# Patient Record
Sex: FEMALE | Race: WHITE | NOT HISPANIC OR LATINO
[De-identification: names, ages, dates, MRNs, and addresses within clinical notes are randomized per-mention and may not be internally consistent; named-entity substitution may affect disease eponyms.]

---
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
## 2023-01-04 ENCOUNTER — APPOINTMENT (OUTPATIENT)
Dept: BARIATRICS | Facility: CLINIC | Age: 18
End: 2023-01-04

## 2023-01-05 ENCOUNTER — APPOINTMENT (OUTPATIENT)
Dept: BARIATRICS | Facility: CLINIC | Age: 18
End: 2023-01-05
Payer: MEDICAID

## 2023-01-05 VITALS
HEART RATE: 101 BPM | TEMPERATURE: 96.7 F | DIASTOLIC BLOOD PRESSURE: 74 MMHG | OXYGEN SATURATION: 99 % | SYSTOLIC BLOOD PRESSURE: 114 MMHG | WEIGHT: 286.82 LBS | BODY MASS INDEX: 43.47 KG/M2 | HEIGHT: 68 IN

## 2023-01-05 PROBLEM — E66.01 MORBID (SEVERE) OBESITY DUE TO EXCESS CALORIES: Chronic | Status: ACTIVE | Noted: 2022-12-13

## 2023-01-05 PROBLEM — G47.33 OBSTRUCTIVE SLEEP APNEA (ADULT) (PEDIATRIC): Chronic | Status: ACTIVE | Noted: 2022-12-13

## 2023-01-05 PROCEDURE — 99024 POSTOP FOLLOW-UP VISIT: CPT

## 2023-01-05 RX ORDER — ERGOCALCIFEROL 1.25 MG/1
1.25 MG CAPSULE, LIQUID FILLED ORAL
Qty: 8 | Refills: 0 | Status: DISCONTINUED | COMMUNITY
Start: 2022-12-07 | End: 2023-01-05

## 2023-01-05 RX ORDER — HYOSCYAMINE SULFATE 0.12 MG/1
0.12 TABLET, ORALLY DISINTEGRATING ORAL 4 TIMES DAILY
Qty: 60 | Refills: 1 | Status: DISCONTINUED | COMMUNITY
Start: 2022-12-21 | End: 2023-01-05

## 2023-01-05 RX ORDER — OXYCODONE 5 MG/1
5 TABLET ORAL
Qty: 6 | Refills: 0 | Status: DISCONTINUED | COMMUNITY
Start: 2022-12-11 | End: 2023-01-05

## 2023-01-05 RX ORDER — ONDANSETRON 4 MG/1
4 TABLET, ORALLY DISINTEGRATING ORAL EVERY 8 HOURS
Qty: 15 | Refills: 0 | Status: DISCONTINUED | COMMUNITY
Start: 2022-12-11 | End: 2023-01-05

## 2023-01-05 NOTE — PHYSICAL EXAM
[Normal] : affect appropriate [de-identified] : soft, NT, ND, no diastasis appreciated\par steri strips removed, incisions clean/dry, healing well with wound edges well approximated, no drainage or bleeding

## 2023-01-05 NOTE — ASSESSMENT
[FreeTextEntry1] : 17 year old F is 1 week s/p Laparoscopic Sleeve Gastrectomy and recovering well, feels good. Pt recently started to consume protein powder as she is having nausea from protein shake and anything sweet. Consuming approximately 2 scoops of the protein powder per day.\par Doing well overall.\par \par Nutrition and exercise guidelines reviewed\par Protein focused diet with 3 meals/day\par Discussed with pt additional option for protein source - protein water\par Otherwise continue Isopure if tolerating well\par Increase daily zero calorie liquid intake 64 oz/day\par Constipation remedies list given to pt\par \par Start bariatric vitamins today\par Advance diet to soft foods next week\par Increase activities and track steps - goal 8-10k steps/day\par Lifting restrictions for 5 more weeks - nothing greater than 5 lbs\par \par Return to office in next week\par Followup with Nutritionist and nutrition classes\par All questions answered \par \par Pt seen with Dr. Celis

## 2023-01-05 NOTE — HISTORY OF PRESENT ILLNESS
[Procedure: ___] : Procedure performed: [unfilled]  [Date of Surgery: ___] : Date of Surgery:   [unfilled] [Surgeon Name:   ___] : Surgeon Name: Dr. ARSHAD [Pre-Op Weight ___] : Pre-op weight was [unfilled] lbs [de-identified] : 17 year old F is 1 week s/p Laparoscopic Sleeve Gastrectomy and recovering well, feels good. Pt recently started to consume protein powder as she is having nausea from protein shake and anything sweet. Consuming approximately 2 scoops of the Isopure protein powder/day, ~25g/scoop. Tolerating zero calorie liquid well. Urine color transparent yellow. Reports no BM since surgery but reports no abdominal discomfort, taking Miralax. Taking omeprazole daily. Tolerating walking and wearing binder as needed. No abdominal pain, reflux, nausea, vomiting, constipation or diarrhea, fever, chills or sweats. No calf tenderness, chest pain, palpitations, or shortness of breath.\par

## 2023-01-12 ENCOUNTER — APPOINTMENT (OUTPATIENT)
Dept: BARIATRICS | Facility: CLINIC | Age: 18
End: 2023-01-12
Payer: MEDICAID

## 2023-01-12 VITALS — HEIGHT: 68 IN | WEIGHT: 279 LBS | BODY MASS INDEX: 42.28 KG/M2

## 2023-01-12 DIAGNOSIS — K59.00 CONSTIPATION, UNSPECIFIED: ICD-10-CM

## 2023-01-12 DIAGNOSIS — R63.4 ABNORMAL WEIGHT LOSS: ICD-10-CM

## 2023-01-12 DIAGNOSIS — E55.9 VITAMIN D DEFICIENCY, UNSPECIFIED: ICD-10-CM

## 2023-01-12 PROCEDURE — 99024 POSTOP FOLLOW-UP VISIT: CPT

## 2023-01-12 NOTE — REASON FOR VISIT
[Medical Office: ([facility])___] : at the medical office located in  [Patient] : the patient [Self] : self [Follow-Up Visit] : a follow-up visit for [S/P Bariatric Surgery] : s/p bariatric surgery [Parent] : parent [Home] : at home, [unfilled] , at the time of the visit.

## 2023-01-17 NOTE — HISTORY OF PRESENT ILLNESS
[Procedure: ___] : Procedure performed: [unfilled]  [Date of Surgery: ___] : Date of Surgery:   [unfilled] [Surgeon Name:   ___] : Surgeon Name: Dr. ARSHAD [Pre-Op Weight ___] : Pre-op weight was [unfilled] lbs [de-identified] : 17 year old F is 2 week s/p Laparoscopic Sleeve Gastrectomy. Pt continues to consume 2 scoops of the Isopure protein powder/day, ~25g/scoop. Will start soft/puree foods today. Continues to drink adequate zero calorie liquid, 80oz/day. Urine color transparent yellow. Reports no BM after adding Colace 1x/day to Miralax. Taking omeprazole daily. Bariatric chewables making pt nauseous, which is relieved with Zofran. Tolerating walking and wearing binder as needed.

## 2023-01-17 NOTE — ASSESSMENT
[FreeTextEntry1] : 17 year old F is 2 week s/p Laparoscopic Sleeve Gastrectomy. Pt continues to consume 2 scoops of the Isopure protein powder/day, ~25g/scoop. Will start soft/puree foods today. Continues to drink adequate zero calorie liquid, 80oz/day. Reports no BM after adding Colace 1x/day to Miralax.\par Doing well overall.\par \par Nutrition and exercise guidelines reviewed\par Protein focused diet with 3 meals/day\par Continue Isopure and protein water - decrease as consumption of protein from food sources increases\par Maintain daily zero calorie liquid intake 64 oz/day\par Advised pt to increase Colace to TID and add Metamucil or Benefiber\par \par Continue bariatric vitamins - Zofran as needed\par Start soft foods today\par Increase activities and track steps - goal 8-10k steps/day\par Lifting restrictions for 4 more weeks - nothing greater than 5 lbs\par \par Return to office in 2 weeks\par Followup with Nutritionist and nutrition classes\par All questions answered

## 2023-01-19 ENCOUNTER — NON-APPOINTMENT (OUTPATIENT)
Age: 18
End: 2023-01-19

## 2023-01-20 ENCOUNTER — EMERGENCY (EMERGENCY)
Facility: HOSPITAL | Age: 18
LOS: 0 days | Discharge: HOME | End: 2023-01-21
Attending: EMERGENCY MEDICINE | Admitting: EMERGENCY MEDICINE
Payer: MEDICAID

## 2023-01-20 VITALS
TEMPERATURE: 98 F | OXYGEN SATURATION: 98 % | HEART RATE: 84 BPM | RESPIRATION RATE: 20 BRPM | SYSTOLIC BLOOD PRESSURE: 111 MMHG | DIASTOLIC BLOOD PRESSURE: 76 MMHG | WEIGHT: 273.81 LBS

## 2023-01-20 DIAGNOSIS — G47.33 OBSTRUCTIVE SLEEP APNEA (ADULT) (PEDIATRIC): ICD-10-CM

## 2023-01-20 DIAGNOSIS — R11.2 NAUSEA WITH VOMITING, UNSPECIFIED: ICD-10-CM

## 2023-01-20 DIAGNOSIS — Z91.013 ALLERGY TO SEAFOOD: ICD-10-CM

## 2023-01-20 DIAGNOSIS — Z98.84 BARIATRIC SURGERY STATUS: ICD-10-CM

## 2023-01-20 LAB
BASOPHILS # BLD AUTO: 0.06 K/UL — SIGNIFICANT CHANGE UP (ref 0–0.2)
BASOPHILS NFR BLD AUTO: 0.9 % — SIGNIFICANT CHANGE UP (ref 0–1)
EOSINOPHIL # BLD AUTO: 0.14 K/UL — SIGNIFICANT CHANGE UP (ref 0–0.7)
EOSINOPHIL NFR BLD AUTO: 2 % — SIGNIFICANT CHANGE UP (ref 0–8)
HCT VFR BLD CALC: 41.4 % — SIGNIFICANT CHANGE UP (ref 37–47)
HGB BLD-MCNC: 13.5 G/DL — SIGNIFICANT CHANGE UP (ref 12–16)
IMM GRANULOCYTES NFR BLD AUTO: 0.1 % — SIGNIFICANT CHANGE UP (ref 0.1–0.3)
INR BLD: 1.41 RATIO — HIGH (ref 0.65–1.3)
LYMPHOCYTES # BLD AUTO: 2.02 K/UL — SIGNIFICANT CHANGE UP (ref 1.2–3.4)
LYMPHOCYTES # BLD AUTO: 29 % — SIGNIFICANT CHANGE UP (ref 20.5–51.1)
MCHC RBC-ENTMCNC: 27 PG — SIGNIFICANT CHANGE UP (ref 27–31)
MCHC RBC-ENTMCNC: 32.6 G/DL — SIGNIFICANT CHANGE UP (ref 32–37)
MCV RBC AUTO: 82.8 FL — SIGNIFICANT CHANGE UP (ref 81–99)
MONOCYTES # BLD AUTO: 0.7 K/UL — HIGH (ref 0.1–0.6)
MONOCYTES NFR BLD AUTO: 10 % — HIGH (ref 1.7–9.3)
NEUTROPHILS # BLD AUTO: 4.04 K/UL — SIGNIFICANT CHANGE UP (ref 1.4–6.5)
NEUTROPHILS NFR BLD AUTO: 58 % — SIGNIFICANT CHANGE UP (ref 42.2–75.2)
NRBC # BLD: 0 /100 WBCS — SIGNIFICANT CHANGE UP (ref 0–0)
PLATELET # BLD AUTO: 294 K/UL — SIGNIFICANT CHANGE UP (ref 130–400)
PROTHROM AB SERPL-ACNC: 16.2 SEC — HIGH (ref 9.95–12.87)
RBC # BLD: 5 M/UL — SIGNIFICANT CHANGE UP (ref 4.2–5.4)
RBC # FLD: 15 % — HIGH (ref 11.5–14.5)
WBC # BLD: 6.97 K/UL — SIGNIFICANT CHANGE UP (ref 4.8–10.8)
WBC # FLD AUTO: 6.97 K/UL — SIGNIFICANT CHANGE UP (ref 4.8–10.8)

## 2023-01-20 PROCEDURE — 99285 EMERGENCY DEPT VISIT HI MDM: CPT

## 2023-01-20 RX ORDER — SODIUM CHLORIDE 9 MG/ML
1000 INJECTION INTRAMUSCULAR; INTRAVENOUS; SUBCUTANEOUS ONCE
Refills: 0 | Status: COMPLETED | OUTPATIENT
Start: 2023-01-20 | End: 2023-01-20

## 2023-01-20 RX ORDER — DIATRIZOATE MEGLUMINE 180 MG/ML
30 INJECTION, SOLUTION INTRAVESICAL ONCE
Refills: 0 | Status: COMPLETED | OUTPATIENT
Start: 2023-01-20 | End: 2023-01-20

## 2023-01-20 RX ADMIN — SODIUM CHLORIDE 1000 MILLILITER(S): 9 INJECTION INTRAMUSCULAR; INTRAVENOUS; SUBCUTANEOUS at 23:50

## 2023-01-20 RX ADMIN — DIATRIZOATE MEGLUMINE 30 MILLILITER(S): 180 INJECTION, SOLUTION INTRAVESICAL at 23:45

## 2023-01-21 LAB
ALBUMIN SERPL ELPH-MCNC: 4.3 G/DL — SIGNIFICANT CHANGE UP (ref 3.5–5.2)
ALP SERPL-CCNC: 91 U/L — SIGNIFICANT CHANGE UP (ref 30–115)
ALT FLD-CCNC: 71 U/L — HIGH (ref 14–37)
ANION GAP SERPL CALC-SCNC: 18 MMOL/L — HIGH (ref 7–14)
APTT BLD: 30.9 SEC — SIGNIFICANT CHANGE UP (ref 27–39.2)
AST SERPL-CCNC: 30 U/L — SIGNIFICANT CHANGE UP (ref 14–37)
BILIRUB SERPL-MCNC: 0.8 MG/DL — SIGNIFICANT CHANGE UP (ref 0.2–1.2)
BLD GP AB SCN SERPL QL: SIGNIFICANT CHANGE UP
BUN SERPL-MCNC: 9 MG/DL — LOW (ref 10–20)
CALCIUM SERPL-MCNC: 9.8 MG/DL — SIGNIFICANT CHANGE UP (ref 8.4–10.5)
CHLORIDE SERPL-SCNC: 94 MMOL/L — LOW (ref 98–110)
CO2 SERPL-SCNC: 23 MMOL/L — SIGNIFICANT CHANGE UP (ref 17–32)
CREAT SERPL-MCNC: 0.7 MG/DL — SIGNIFICANT CHANGE UP (ref 0.3–1)
EGFR: 128 ML/MIN/1.73M2 — SIGNIFICANT CHANGE UP
GLUCOSE SERPL-MCNC: 95 MG/DL — SIGNIFICANT CHANGE UP (ref 70–99)
HCG SERPL QL: NEGATIVE — SIGNIFICANT CHANGE UP
LIDOCAIN IGE QN: 65 U/L — HIGH (ref 7–60)
POTASSIUM SERPL-MCNC: 3.4 MMOL/L — LOW (ref 3.5–5)
POTASSIUM SERPL-SCNC: 3.4 MMOL/L — LOW (ref 3.5–5)
PROT SERPL-MCNC: 7.1 G/DL — SIGNIFICANT CHANGE UP (ref 6.1–8)
RAPID RVP RESULT: SIGNIFICANT CHANGE UP
SARS-COV-2 RNA SPEC QL NAA+PROBE: SIGNIFICANT CHANGE UP
SODIUM SERPL-SCNC: 135 MMOL/L — SIGNIFICANT CHANGE UP (ref 135–146)

## 2023-01-21 PROCEDURE — 74177 CT ABD & PELVIS W/CONTRAST: CPT | Mod: 26,MA

## 2023-01-21 RX ORDER — POTASSIUM CHLORIDE 20 MEQ
40 PACKET (EA) ORAL ONCE
Refills: 0 | Status: COMPLETED | OUTPATIENT
Start: 2023-01-21 | End: 2023-01-21

## 2023-01-21 RX ORDER — POTASSIUM CHLORIDE 20 MEQ
20 PACKET (EA) ORAL ONCE
Refills: 0 | Status: DISCONTINUED | OUTPATIENT
Start: 2023-01-21 | End: 2023-01-21

## 2023-01-21 RX ADMIN — Medication 40 MILLIEQUIVALENT(S): at 04:00

## 2023-01-21 NOTE — ED PROVIDER NOTE - OBJECTIVE STATEMENT
Pt is a 16 y/o female with PMH of SANDOVAL and obesity s/p gastric sleeve on 12/28/22 at [facility] with Dr. Celis presenting for nausea x 1 month. Pt says since surgery, she has been nauseous every day and was given zofran without relief. The last 3 days has been vomiting and unable to tolerate po. Pt reports she has not had a BM since before the surgery, denies any form of BM, solid, loose or watery. Says she is passing gas. No fever, chills, abdominal pain, dysuria or hematuria.

## 2023-01-21 NOTE — ED PROVIDER NOTE - ATTENDING CONTRIBUTION TO CARE
17-year-old female with PMH SANDOVAL status post gastric sleeve 12/28/22 presents for evaluation of persistent nausea and multiple episodes of nonbilious vomiting after recent gastric sleeve.  Reports inability to tolerate p.o. despite taking antiemetics at home. Patient denies any diarrhea, melena, hematochezia, urinary complaints, fevers or chills.  Patient reports decreased bowel movements since prior to surgery but is passing gas.    VITAL SIGNS: noted  CONSTITUTIONAL: Well-developed; well-nourished; in no acute distress  HEAD: Normocephalic; atraumatic  EYES: PERRL, EOM intact; conjunctiva and sclera clear  ENT: No nasal discharge; airway clear. MMM  NECK: Supple; non tender. No anterior cervical lymphadenopathy noted  CARD: S1, S2 normal; no murmurs, gallops, or rubs. Regular rate and rhythm  RESP: CTAB/L, no wheezes, rales or rhonchi  ABD: Normal bowel sounds; soft; non-distended; non-tender; no CVA tenderness. Surgical incisions well-healing.  EXT: Normal ROM. No calf tenderness or edema. Distal pulses intact  NEURO: Alert, oriented. Grossly unremarkable. No focal deficits  SKIN: Skin exam is warm and dry, no acute rash

## 2023-01-21 NOTE — ED PROVIDER NOTE - CLINICAL SUMMARY MEDICAL DECISION MAKING FREE TEXT BOX
Patient evaluated for vomiting, labs reviewed, lipase 65.  CT abdomen pelvis with postsurgical changes no acute pathology.  Discussed with patient's surgeon on-call at [place] to review work-up, recommended p.o. trial and follow-up in office.  Patient tolerating p.o. and feeling well to go home.  Agreed to follow-up closely with surgeon on Monday.  Strict return precautions advised and patient verbalized understanding. Copies of work-up given to patient.

## 2023-01-21 NOTE — ED PROVIDER NOTE - PATIENT PORTAL LINK FT
You can access the FollowMyHealth Patient Portal offered by [facility] by registering at the following website: [email or web address]. By joining [employer]’s FollowMyHealth portal, you will also be able to view your health information using other applications (apps) compatible with our system.

## 2023-01-21 NOTE — ED PROVIDER NOTE - PROGRESS NOTE DETAILS
PS: Pt feeling well, asking to eat. Pt understands to stay NPO. Pending CT read PS: CT negative. Spoke to surgeon on call at [facility], aware of lab results and imaging. Recommends po trial and if pt able to tolerate po, can be discharged to follow up in office on Monday PS: Pt tolerated po. Feels well. Will dc to follow up with surgeon outpatient

## 2023-01-21 NOTE — ED PROVIDER NOTE - PHYSICAL EXAMINATION
CONST: well appearing for age  HEAD:  normocephalic, atraumatic  EYES:  conjunctivae without injection, drainage or discharge  ENMT: nasal mucosa moist; mouth moist without ulcerations or lesions; throat moist without erythema, exudate, ulcerations or lesions  NECK:  supple  CARDIAC:  regular rate and rhythm, normal S1 and S2, no murmurs, rubs or gallops  RESP:  respiratory rate and effort appear normal for age; lungs are clear to auscultation bilaterally; no rales or wheezes  ABDOMEN:  soft, well healing surgical scars across abdomen, nontender  MUSCULOSKELETAL/NEURO: awake and alert, sensation intact throughout, HOLMAN, normal movement, normal tone  SKIN:  normal skin color for age and race, well-perfused; warm and dry

## 2023-01-21 NOTE — ED PROVIDER NOTE - CARE PROVIDER_API CALL
Pippa Celis (MD)  Surgery  [address] [place] [address]  Phone: [phone]  Fax: [phone]  Follow Up Time: 1-3 Days

## 2023-01-23 ENCOUNTER — APPOINTMENT (OUTPATIENT)
Dept: BARIATRICS | Facility: CLINIC | Age: 18
End: 2023-01-23

## 2023-01-23 ENCOUNTER — INPATIENT (INPATIENT)
Facility: HOSPITAL | Age: 18
LOS: 1 days | Discharge: HOME | DRG: 392 | End: 2023-01-25
Attending: SURGERY | Admitting: SURGERY
Payer: COMMERCIAL

## 2023-01-23 VITALS
HEIGHT: 68 IN | SYSTOLIC BLOOD PRESSURE: 116 MMHG | DIASTOLIC BLOOD PRESSURE: 78 MMHG | BODY MASS INDEX: 41.52 KG/M2 | WEIGHT: 274 LBS | TEMPERATURE: 97 F | OXYGEN SATURATION: 99 % | HEART RATE: 82 BPM

## 2023-01-23 VITALS
RESPIRATION RATE: 15 BRPM | SYSTOLIC BLOOD PRESSURE: 126 MMHG | DIASTOLIC BLOOD PRESSURE: 89 MMHG | WEIGHT: 242.51 LBS | HEART RATE: 71 BPM | TEMPERATURE: 97 F | OXYGEN SATURATION: 97 % | HEIGHT: 68 IN

## 2023-01-23 DIAGNOSIS — K59.00 CONSTIPATION, UNSPECIFIED: ICD-10-CM

## 2023-01-23 DIAGNOSIS — Z90.3 ACQUIRED ABSENCE OF STOMACH [PART OF]: Chronic | ICD-10-CM

## 2023-01-23 LAB
ANION GAP SERPL CALC-SCNC: 17 MMOL/L — SIGNIFICANT CHANGE UP (ref 5–17)
APPEARANCE UR: ABNORMAL
BACTERIA # UR AUTO: ABNORMAL
BILIRUB UR-MCNC: ABNORMAL
BUN SERPL-MCNC: 7 MG/DL — SIGNIFICANT CHANGE UP (ref 7–23)
CALCIUM SERPL-MCNC: 9.5 MG/DL — SIGNIFICANT CHANGE UP (ref 8.4–10.5)
CHLORIDE SERPL-SCNC: 97 MMOL/L — SIGNIFICANT CHANGE UP (ref 96–108)
CO2 SERPL-SCNC: 24 MMOL/L — SIGNIFICANT CHANGE UP (ref 22–31)
COLOR SPEC: YELLOW — SIGNIFICANT CHANGE UP
COMMENT - URINE: SIGNIFICANT CHANGE UP
CREAT SERPL-MCNC: 0.69 MG/DL — SIGNIFICANT CHANGE UP (ref 0.5–1.3)
DIFF PNL FLD: NEGATIVE — SIGNIFICANT CHANGE UP
EGFR: 128 ML/MIN/1.73M2 — SIGNIFICANT CHANGE UP
EPI CELLS # UR: SIGNIFICANT CHANGE UP
GLUCOSE SERPL-MCNC: 113 MG/DL — HIGH (ref 70–99)
GLUCOSE UR QL: NEGATIVE MG/DL — SIGNIFICANT CHANGE UP
HCG UR QL: NEGATIVE — SIGNIFICANT CHANGE UP
HCT VFR BLD CALC: 43.5 % — SIGNIFICANT CHANGE UP (ref 34.5–45)
HGB BLD-MCNC: 13.9 G/DL — SIGNIFICANT CHANGE UP (ref 11.5–15.5)
KETONES UR-MCNC: ABNORMAL
LEUKOCYTE ESTERASE UR-ACNC: ABNORMAL
MCHC RBC-ENTMCNC: 26.6 PG — LOW (ref 27–34)
MCHC RBC-ENTMCNC: 32 GM/DL — SIGNIFICANT CHANGE UP (ref 32–36)
MCV RBC AUTO: 83.3 FL — SIGNIFICANT CHANGE UP (ref 80–100)
NITRITE UR-MCNC: NEGATIVE — SIGNIFICANT CHANGE UP
NRBC # BLD: 0 /100 WBCS — SIGNIFICANT CHANGE UP (ref 0–0)
PH UR: 7 — SIGNIFICANT CHANGE UP (ref 5–8)
PLATELET # BLD AUTO: 293 K/UL — SIGNIFICANT CHANGE UP (ref 150–400)
POTASSIUM SERPL-MCNC: 3.5 MMOL/L — SIGNIFICANT CHANGE UP (ref 3.5–5.3)
POTASSIUM SERPL-SCNC: 3.5 MMOL/L — SIGNIFICANT CHANGE UP (ref 3.5–5.3)
PROT UR-MCNC: 100 MG/DL
RBC # BLD: 5.22 M/UL — HIGH (ref 3.8–5.2)
RBC # FLD: 15.1 % — HIGH (ref 10.3–14.5)
RBC CASTS # UR COMP ASSIST: SIGNIFICANT CHANGE UP /HPF (ref 0–4)
SARS-COV-2 RNA SPEC QL NAA+PROBE: SIGNIFICANT CHANGE UP
SODIUM SERPL-SCNC: 138 MMOL/L — SIGNIFICANT CHANGE UP (ref 135–145)
SP GR SPEC: 1.02 — SIGNIFICANT CHANGE UP (ref 1.01–1.02)
UROBILINOGEN FLD QL: 12 MG/DL
WBC # BLD: 6.45 K/UL — SIGNIFICANT CHANGE UP (ref 3.8–10.5)
WBC # FLD AUTO: 6.45 K/UL — SIGNIFICANT CHANGE UP (ref 3.8–10.5)
WBC UR QL: SIGNIFICANT CHANGE UP

## 2023-01-23 PROCEDURE — 99285 EMERGENCY DEPT VISIT HI MDM: CPT

## 2023-01-23 RX ORDER — SODIUM CHLORIDE 9 MG/ML
1000 INJECTION, SOLUTION INTRAVENOUS
Refills: 0 | Status: DISCONTINUED | OUTPATIENT
Start: 2023-01-23 | End: 2023-01-25

## 2023-01-23 RX ORDER — PANTOPRAZOLE SODIUM 20 MG/1
40 TABLET, DELAYED RELEASE ORAL ONCE
Refills: 0 | Status: COMPLETED | OUTPATIENT
Start: 2023-01-24 | End: 2023-01-24

## 2023-01-23 RX ORDER — FOLIC ACID 0.8 MG
1 TABLET ORAL EVERY 24 HOURS
Refills: 0 | Status: DISCONTINUED | OUTPATIENT
Start: 2023-01-24 | End: 2023-01-25

## 2023-01-23 RX ORDER — ONDANSETRON 8 MG/1
4 TABLET, FILM COATED ORAL ONCE
Refills: 0 | Status: COMPLETED | OUTPATIENT
Start: 2023-01-23 | End: 2023-01-23

## 2023-01-23 RX ORDER — THIAMINE MONONITRATE (VIT B1) 100 MG
100 TABLET ORAL ONCE
Refills: 0 | Status: COMPLETED | OUTPATIENT
Start: 2023-01-23 | End: 2023-01-23

## 2023-01-23 RX ORDER — FOLIC ACID 0.8 MG
1 TABLET ORAL ONCE
Refills: 0 | Status: COMPLETED | OUTPATIENT
Start: 2023-01-23 | End: 2023-01-23

## 2023-01-23 RX ORDER — PANTOPRAZOLE SODIUM 20 MG/1
40 TABLET, DELAYED RELEASE ORAL ONCE
Refills: 0 | Status: COMPLETED | OUTPATIENT
Start: 2023-01-23 | End: 2023-01-23

## 2023-01-23 RX ORDER — SODIUM CHLORIDE 9 MG/ML
1000 INJECTION INTRAMUSCULAR; INTRAVENOUS; SUBCUTANEOUS ONCE
Refills: 0 | Status: COMPLETED | OUTPATIENT
Start: 2023-01-23 | End: 2023-01-23

## 2023-01-23 RX ORDER — ENOXAPARIN SODIUM 100 MG/ML
40 INJECTION SUBCUTANEOUS EVERY 24 HOURS
Refills: 0 | Status: DISCONTINUED | OUTPATIENT
Start: 2023-01-23 | End: 2023-01-25

## 2023-01-23 RX ORDER — THIAMINE MONONITRATE (VIT B1) 100 MG
100 TABLET ORAL DAILY
Refills: 0 | Status: DISCONTINUED | OUTPATIENT
Start: 2023-01-24 | End: 2023-01-25

## 2023-01-23 RX ORDER — ONDANSETRON 8 MG/1
4 TABLET, FILM COATED ORAL EVERY 6 HOURS
Refills: 0 | Status: DISCONTINUED | OUTPATIENT
Start: 2023-01-23 | End: 2023-01-24

## 2023-01-23 RX ORDER — HYOSCYAMINE SULFATE 0.13 MG
0.12 TABLET ORAL EVERY 6 HOURS
Refills: 0 | Status: DISCONTINUED | OUTPATIENT
Start: 2023-01-23 | End: 2023-01-25

## 2023-01-23 RX ADMIN — Medication 1 MILLIGRAM(S): at 15:41

## 2023-01-23 RX ADMIN — SODIUM CHLORIDE 100 MILLILITER(S): 9 INJECTION, SOLUTION INTRAVENOUS at 17:59

## 2023-01-23 RX ADMIN — ONDANSETRON 4 MILLIGRAM(S): 8 TABLET, FILM COATED ORAL at 17:59

## 2023-01-23 RX ADMIN — ONDANSETRON 4 MILLIGRAM(S): 8 TABLET, FILM COATED ORAL at 23:15

## 2023-01-23 RX ADMIN — ONDANSETRON 4 MILLIGRAM(S): 8 TABLET, FILM COATED ORAL at 15:42

## 2023-01-23 RX ADMIN — Medication 100 MILLIGRAM(S): at 15:41

## 2023-01-23 RX ADMIN — SODIUM CHLORIDE 100 MILLILITER(S): 9 INJECTION, SOLUTION INTRAVENOUS at 15:41

## 2023-01-23 RX ADMIN — ENOXAPARIN SODIUM 40 MILLIGRAM(S): 100 INJECTION SUBCUTANEOUS at 17:59

## 2023-01-23 RX ADMIN — SODIUM CHLORIDE 1000 MILLILITER(S): 9 INJECTION INTRAMUSCULAR; INTRAVENOUS; SUBCUTANEOUS at 14:24

## 2023-01-23 RX ADMIN — PANTOPRAZOLE SODIUM 40 MILLIGRAM(S): 20 TABLET, DELAYED RELEASE ORAL at 15:41

## 2023-01-23 NOTE — ED PROVIDER NOTE - CLINICAL SUMMARY MEDICAL DECISION MAKING FREE TEXT BOX
Pt is a 18 y/o female with PMH of SANDOVAL and obesity s/p gastric sleeve on 12/28/22 at [facility] with Dr. Celis presenting for nausea x 1 month. Pt says since surgery, she has been nauseous every day and was given zofran without relief. The last few days has been vomiting and unable to tolerate po. Pt reports she has not had a BM since before the surgery, denies any form of BM, solid, loose or watery. Says she is passing gas. No fever, chills, abdominal pain, dysuria or hematuria. Seen at [facility] 2 days ago for same and had CT abd which was unrevealing.  Seen by Lalita today who referred to ED for eval.  VSS Afebrile, NAD  HEENT - clear  PERRL EOMI  Neck supple  lungs clear  Cor S1S2 RR - MGR  Abd soft nontender, no mass or HSM, no rebound  Ext FROM intact, no edema  Neuro Intact, no deficits.  Skin Warm and dry no rash.  Imp- Post op pain/constipation.  RO obst, RO opioid side effect.  Plan - Surgery to eval. labs, IVF, Enemas. May need admission for further eval and treatment.    I performed a history and physical exam of the patient and discussed their management with the advanced care provider. I reviewed the advanced care provider's note and agree with the documented findings and plan of care. My medical decision making and objective findings are found above.

## 2023-01-23 NOTE — PATIENT PROFILE ADULT - FALL HARM RISK - UNIVERSAL INTERVENTIONS
Bed in lowest position, wheels locked, appropriate side rails in place/Call bell, personal items and telephone in reach/Instruct patient to call for assistance before getting out of bed or chair/Non-slip footwear when patient is out of bed/[place] to call system/Physically safe environment - no spills, clutter or unnecessary equipment/Purposeful Proactive Rounding/Room/bathroom lighting operational, light cord in reach

## 2023-01-23 NOTE — H&P ADULT - HISTORY OF PRESENT ILLNESS
18 y/o HF with PMHx of SANDOVAL on CPAP, morbid obesity h/o laparoscopic sleeve gastrectomy on 12/28 presents to [facility] ER reporting 5 day h/o nausea, vomiting, constipation x 3 weeks.  Patient reports ability to tolerate puree foods, baby food prior to last Wednesday, began vomiting which worsened over the course of several days and constipation which prompted a visit to the ER at [facility] where she was evaluated, given IVF and discharged after tolerating clears/water. In ER, CT ABD/PELVIS (1/21) revealed no obstruction.  Patient seen in office today and instructed to go the ER for further evaluation.  Patient currently reports nausea, intermittent lightheadedness, david-colored urine, flatus, no BM.    Denies abdominal pain, HA, SOB, CP, dysuria, recent illness.

## 2023-01-23 NOTE — H&P ADULT - ASSESSMENT
A/P: 16 y/o HF with nausea, constipation, h/o laparoscopic sleeve gastrectomy, CT 1/21 (-) for obstruction,    Admit  to Surgery - Dr. YOLETTE Celis  Vitals q shift.  GI/DVT prophylaxis.  OOB/ambulation.  IVF with MVI additive.  SMOG enema STAT.  Antiemetics - Zofran, Levsin.  Bariatric clear liquid diet as tolerated.  AM labs.    Case & plan discussed with Bariatric Surgeon/Dr. YOLETTE Celis and patient's nurse.   A/P: 18 y/o HF with nausea, vomiting, constipation, h/o laparoscopic sleeve gastrectomy 12/28/22, recent imaging (-) for obstruction,    Admit  to Surgery - Dr. YOLETTE Celis  Vitals q shift.  GI/DVT prophylaxis.  OOB/ambulation.  IVF with MVI additive.  SMOG enema STAT.  Antiemetics - Zofran, Levsin.  Bariatric clear liquid diet as tolerated.  AM labs.    Case & plan discussed with Bariatric Surgeon/Dr. YOLETTE Celis and patient's nurse.

## 2023-01-23 NOTE — ED PROVIDER NOTE - NS ED ATTENDING STATEMENT MOD
This was a shared visit with the MAHI. I reviewed and verified the documentation and independently performed the documented:

## 2023-01-23 NOTE — ED ADULT NURSE NOTE - OBJECTIVE STATEMENT
14:09-Pt presented to ER with c/o n/v along with constipation, was seen at another Hospital 2 days ago for the same, reports gastric sleeve on Dec 29 and has no BM since. In ER eval by Provider, surgery consult done, ivf infusing, will treat per plan of care. INDIRA Fatima

## 2023-01-23 NOTE — H&P ADULT - TIME BILLING
Agree with above.  Patient seen and examined.  patient approximately 3 weeks out from laparoscopic sleeve gastrectomy has not had BM and is nauseous.  Patient was seen 2 days ago in ER at [facility] labs and CT scan performed, Patient discharged home tolerating clears and told continue miralax or colace.  Patient spoke to our office suggested suppository or enema.  Patient did not take stool softener or suppository, has not had BM and is again nauseous.  Patient was seen in office in no distress, VSS abdomen soft nontender.  Sent to ER for IVF, MVI, zofran and enema.  Discussed plan in detail with patient and mother.

## 2023-01-24 RX ORDER — SODIUM CHLORIDE 9 MG/ML
1000 INJECTION, SOLUTION INTRAVENOUS
Refills: 0 | Status: DISCONTINUED | OUTPATIENT
Start: 2023-01-24 | End: 2023-01-25

## 2023-01-24 RX ORDER — ONDANSETRON 8 MG/1
4 TABLET, FILM COATED ORAL EVERY 6 HOURS
Refills: 0 | Status: DISCONTINUED | OUTPATIENT
Start: 2023-01-24 | End: 2023-01-25

## 2023-01-24 RX ORDER — POLYETHYLENE GLYCOL 3350 17 G/17G
17 POWDER, FOR SOLUTION ORAL
Refills: 0 | Status: DISCONTINUED | OUTPATIENT
Start: 2023-01-24 | End: 2023-01-25

## 2023-01-24 RX ADMIN — Medication 1 MILLIGRAM(S): at 11:51

## 2023-01-24 RX ADMIN — POLYETHYLENE GLYCOL 3350 17 GRAM(S): 17 POWDER, FOR SOLUTION ORAL at 11:50

## 2023-01-24 RX ADMIN — Medication 100 MILLIGRAM(S): at 11:50

## 2023-01-24 RX ADMIN — PANTOPRAZOLE SODIUM 40 MILLIGRAM(S): 20 TABLET, DELAYED RELEASE ORAL at 05:29

## 2023-01-24 RX ADMIN — ONDANSETRON 4 MILLIGRAM(S): 8 TABLET, FILM COATED ORAL at 05:29

## 2023-01-24 RX ADMIN — ENOXAPARIN SODIUM 40 MILLIGRAM(S): 100 INJECTION SUBCUTANEOUS at 17:13

## 2023-01-24 RX ADMIN — SODIUM CHLORIDE 100 MILLILITER(S): 9 INJECTION, SOLUTION INTRAVENOUS at 16:47

## 2023-01-24 NOTE — CONSULT NOTE ADULT - ASSESSMENT
16 y/o HF with PMHx of SANDOVAL on CPAP, morbid obesity h/o laparoscopic sleeve gastrectomy on 12/28 presents to [facility] ER reporting 5 day h/o nausea, vomiting, constipation x 3 weeks    sandoval  obesity  dyspepsia  sleeve gastrectomy   18 y/o HF with PMHx of SANDOVAL on CPAP, morbid obesity h/o laparoscopic sleeve gastrectomy on 12/28 presents to [facility] ER reporting 5 day h/o nausea, vomiting, constipation x 3 weeks    sandoval  obesity  dyspepsia  sleeve gastrectomy    surgery eval noted  CT scan reviewed  hydration  dvt p  mobilize  SANDOVAL - CPAP night time use - Sleep hygiene -   monitor VS and HD

## 2023-01-24 NOTE — DIETITIAN INITIAL EVALUATION ADULT - PERTINENT LABORATORY DATA
01-23    138  |  97  |  7   ----------------------------<  113<H>  3.5   |  24  |  0.69    Ca    9.5      23 Jan 2023 14:43

## 2023-01-24 NOTE — CONSULT NOTE ADULT - SUBJECTIVE AND OBJECTIVE BOX
Date/Time Patient Seen:  		  Referring MD:   Data Reviewed	       Patient is a 17y old  Female who presents with a chief complaint of nausea/vomiting (23 Jan 2023 14:58)      Subjective/HPI   History of Present Illness:   16 y/o HF with PMHx of SANDOVAL on CPAP, morbid obesity h/o laparoscopic sleeve gastrectomy on 12/28 presents to [facility] ER reporting 5 day h/o nausea, vomiting, constipation x 3 weeks.  Patient reports ability to tolerate puree foods, baby food prior to last Wednesday, began vomiting which worsened over the course of several days and constipation which prompted a visit to the ER at [facility] where she was evaluated, given IVF and discharged after tolerating clears/water. In ER, CT ABD/PELVIS (1/21) revealed no obstruction.  Patient seen in office today and instructed to go the ER for further evaluation.  Patient currently reports nausea, intermittent lightheadedness, david-colored urine, flatus, no BM.    Denies abdominal pain, HA, SOB, CP, dysuria, recent illness.   PAST MEDICAL & SURGICAL HISTORY:  No pertinent past medical history    Obesity, morbid, BMI 50 or higher    SANDOVAL on CPAP    No significant past surgical history    History of sleeve gastrectomy    PAST MEDICAL HISTORY:  Obesity, morbid, BMI 50 or higher     SANDOVAL on CPAP.     PAST SURGICAL HISTORY:  History of sleeve gastrectomy.     Social History:  · Substance use	No  · Social History (marital status, living situation, occupation, and sexual history)	Denies cigarette smoking.  Denies EtOH.     Tobacco Screening:  · Core Measure Site	No  · Has the patient used tobacco in the past 30 days?	No    Risk Assessment:    Present on Admission:  Deep Venous Thrombosis	no  Pulmonary Embolus	no     HIV Screening:  · In accordance with [place] State law, we offer every patient who comes to our ED an HIV test. Would you like to be tested today?	Previously Declined (within the last year)        Medication list         MEDICATIONS  (STANDING):  enoxaparin Injectable 40 milliGRAM(s) SubCutaneous every 24 hours  folic acid Injectable 1 milliGRAM(s) IV Push every 24 hours  lactated ringers 1000 milliLiter(s) (100 mL/Hr) IV Continuous <Continuous>  lactated ringers 1000 milliLiter(s) (100 mL/Hr) IV Continuous <Continuous>  ondansetron Injectable 4 milliGRAM(s) IV Push every 6 hours  thiamine Injectable 100 milliGRAM(s) IV Push daily    MEDICATIONS  (PRN):  hyoscyamine SL 0.125 milliGRAM(s) SubLingual every 6 hours PRN nausea         Vitals log        ICU Vital Signs Last 24 Hrs  T(C): 36.6 (24 Jan 2023 05:47), Max: 36.7 (23 Jan 2023 19:55)  T(F): 97.8 (24 Jan 2023 05:47), Max: 98.1 (23 Jan 2023 23:52)  HR: 60 (24 Jan 2023 05:47) (55 - 72)  BP: 114/72 (24 Jan 2023 05:47) (111/74 - 126/89)  BP(mean): 90 (23 Jan 2023 20:15) (90 - 101)  ABP: --  ABP(mean): --  RR: 17 (24 Jan 2023 05:47) (15 - 19)  SpO2: 99% (24 Jan 2023 05:47) (97% - 99%)    O2 Parameters below as of 24 Jan 2023 05:47  Patient On (Oxygen Delivery Method): room air                 Input and Output:  I&O's Detail      Lab Data                        13.9   6.45  )-----------( 293      ( 23 Jan 2023 14:43 )             43.5     01-23    138  |  97  |  7   ----------------------------<  113<H>  3.5   |  24  |  0.69    Ca    9.5      23 Jan 2023 14:43              Review of Systems	  dyspepsia      Objective     Physical Examination        Pertinent Lab findings & Imaging      Worthy:  NO   Adequate UO     I&O's Detail           Discussed with:     Cultures:	        Radiology      ACC: [id number] EXAM:  CT ABDOMEN AND PELVIS OC IC   ORDERED BY: TUSHAR DOVE     PROCEDURE DATE:  01/21/2023          INTERPRETATION:  CLINICAL STATEMENT: Vomiting, history of gastric sleeve    TECHNIQUE: Contiguous axial CT images were obtained from the lower chest   to the pubic symphysis following administration of 100cc Optiray 350   intravenous contrast, 0 cc was discarded.  Oral contrast was   administered.  Reformatted images in the coronal and sagittal planes were   acquired.    COMPARISON CT: CT abdomen and pelvis 10/8/2022      FINDINGS:    LOWER CHEST: Unremarkable.    HEPATOBILIARY: 2.1 cm hypodensity adjacent to the falciform ligament,   likely representing focal fatty infiltration. No intrahepatic or   extrahepatic biliary ductal dilatation.    SPLEEN: Unremarkable.    PANCREAS: Unremarkable.    ADRENAL GLANDS: Unremarkable.    KIDNEYS: Symmetric renal enhancement. No hydronephrosis bilaterally..    ABDOMINOPELVIC NODES: No enlarged lymph nodes.    PELVIC ORGANS: Unremarkable.    PERITONEUM/MESENTERY/BOWEL: Interval sleeve gastrectomy. No bowel   obstruction, intra-abdominal free air, or ascites. Normal appendix.    BONES/SOFT TISSUES: No acute osseous abnormality. L4 limbus vertebra.   Post surgical changes of anterior abdominal wall. Small fat-containing   periumbilical hernia    OTHER: Normal caliber abdominal aorta      IMPRESSION:      Interval sleeve gastrectomy with anterior abdominal wall postsurgical   changes.    No bowel obstruction. No other CT evidence of acute intra-abdominal   pathology.    --- End of Report ---          JUAN THAKUR MD; Resident Radiologist  This document has been electronically signed.  DON GRIFFITH MD; Attending Radiologist  This document has been electronically signed. Jan 21 2023  3:07AM                         Date/Time Patient Seen:  		  Referring MD:   Data Reviewed	       Patient is a 17y old  Female who presents with a chief complaint of nausea/vomiting (23 Jan 2023 14:58)      Subjective/HPI   History of Present Illness:   18 y/o HF with PMHx of SANDOVAL on CPAP, morbid obesity h/o laparoscopic sleeve gastrectomy on 12/28 presents to [facility] ER reporting 5 day h/o nausea, vomiting, constipation x 3 weeks.  Patient reports ability to tolerate puree foods, baby food prior to last Wednesday, began vomiting which worsened over the course of several days and constipation which prompted a visit to the ER at [facility] where she was evaluated, given IVF and discharged after tolerating clears/water. In ER, CT ABD/PELVIS (1/21) revealed no obstruction.  Patient seen in office today and instructed to go the ER for further evaluation.  Patient currently reports nausea, intermittent lightheadedness, david-colored urine, flatus, no BM.    Denies abdominal pain, HA, SOB, CP, dysuria, recent illness.   PAST MEDICAL & SURGICAL HISTORY:  No pertinent past medical history    Obesity, morbid, BMI 50 or higher    SANDOVAL on CPAP    No significant past surgical history    History of sleeve gastrectomy    PAST MEDICAL HISTORY:  Obesity, morbid, BMI 50 or higher     SANDOVAL on CPAP.     PAST SURGICAL HISTORY:  History of sleeve gastrectomy.     Social History:  · Substance use	No  · Social History (marital status, living situation, occupation, and sexual history)	Denies cigarette smoking.  Denies EtOH.     Tobacco Screening:  · Core Measure Site	No  · Has the patient used tobacco in the past 30 days?	No    Risk Assessment:    Present on Admission:  Deep Venous Thrombosis	no  Pulmonary Embolus	no     HIV Screening:  · In accordance with [place] State law, we offer every patient who comes to our ED an HIV test. Would you like to be tested today?	Previously Declined (within the last year)        Medication list         MEDICATIONS  (STANDING):  enoxaparin Injectable 40 milliGRAM(s) SubCutaneous every 24 hours  folic acid Injectable 1 milliGRAM(s) IV Push every 24 hours  lactated ringers 1000 milliLiter(s) (100 mL/Hr) IV Continuous <Continuous>  lactated ringers 1000 milliLiter(s) (100 mL/Hr) IV Continuous <Continuous>  ondansetron Injectable 4 milliGRAM(s) IV Push every 6 hours  thiamine Injectable 100 milliGRAM(s) IV Push daily    MEDICATIONS  (PRN):  hyoscyamine SL 0.125 milliGRAM(s) SubLingual every 6 hours PRN nausea         Vitals log        ICU Vital Signs Last 24 Hrs  T(C): 36.6 (24 Jan 2023 05:47), Max: 36.7 (23 Jan 2023 19:55)  T(F): 97.8 (24 Jan 2023 05:47), Max: 98.1 (23 Jan 2023 23:52)  HR: 60 (24 Jan 2023 05:47) (55 - 72)  BP: 114/72 (24 Jan 2023 05:47) (111/74 - 126/89)  BP(mean): 90 (23 Jan 2023 20:15) (90 - 101)  ABP: --  ABP(mean): --  RR: 17 (24 Jan 2023 05:47) (15 - 19)  SpO2: 99% (24 Jan 2023 05:47) (97% - 99%)    O2 Parameters below as of 24 Jan 2023 05:47  Patient On (Oxygen Delivery Method): room air                 Input and Output:  I&O's Detail      Lab Data                        13.9   6.45  )-----------( 293      ( 23 Jan 2023 14:43 )             43.5     01-23    138  |  97  |  7   ----------------------------<  113<H>  3.5   |  24  |  0.69    Ca    9.5      23 Jan 2023 14:43              Review of Systems	  dyspepsia      Objective     Physical Examination    heart s1s2  lung dec BS      Pertinent Lab findings & Imaging      Worthy:  NO   Adequate UO     I&O's Detail           Discussed with:     Cultures:	        Radiology      ACC: [id number] EXAM:  CT ABDOMEN AND PELVIS OC IC   ORDERED BY: TUSHAR DOVE     PROCEDURE DATE:  01/21/2023          INTERPRETATION:  CLINICAL STATEMENT: Vomiting, history of gastric sleeve    TECHNIQUE: Contiguous axial CT images were obtained from the lower chest   to the pubic symphysis following administration of 100cc Optiray 350   intravenous contrast, 0 cc was discarded.  Oral contrast was   administered.  Reformatted images in the coronal and sagittal planes were   acquired.    COMPARISON CT: CT abdomen and pelvis 10/8/2022      FINDINGS:    LOWER CHEST: Unremarkable.    HEPATOBILIARY: 2.1 cm hypodensity adjacent to the falciform ligament,   likely representing focal fatty infiltration. No intrahepatic or   extrahepatic biliary ductal dilatation.    SPLEEN: Unremarkable.    PANCREAS: Unremarkable.    ADRENAL GLANDS: Unremarkable.    KIDNEYS: Symmetric renal enhancement. No hydronephrosis bilaterally..    ABDOMINOPELVIC NODES: No enlarged lymph nodes.    PELVIC ORGANS: Unremarkable.    PERITONEUM/MESENTERY/BOWEL: Interval sleeve gastrectomy. No bowel   obstruction, intra-abdominal free air, or ascites. Normal appendix.    BONES/SOFT TISSUES: No acute osseous abnormality. L4 limbus vertebra.   Post surgical changes of anterior abdominal wall. Small fat-containing   periumbilical hernia    OTHER: Normal caliber abdominal aorta      IMPRESSION:      Interval sleeve gastrectomy with anterior abdominal wall postsurgical   changes.    No bowel obstruction. No other CT evidence of acute intra-abdominal   pathology.    --- End of Report ---          JUAN THAKUR MD; Resident Radiologist  This document has been electronically signed.  DON GRIFFITH MD; Attending Radiologist  This document has been electronically signed. Jan 21 2023  3:07AM

## 2023-01-24 NOTE — DIETITIAN INITIAL EVALUATION ADULT - PERTINENT MEDS FT
MEDICATIONS  (STANDING):  enoxaparin Injectable 40 milliGRAM(s) SubCutaneous every 24 hours  folic acid Injectable 1 milliGRAM(s) IV Push every 24 hours  lactated ringers 1000 milliLiter(s) (100 mL/Hr) IV Continuous <Continuous>  lactated ringers 1000 milliLiter(s) (100 mL/Hr) IV Continuous <Continuous>  polyethylene glycol 3350 17 Gram(s) Oral two times a day  thiamine Injectable 100 milliGRAM(s) IV Push daily    MEDICATIONS  (PRN):  hyoscyamine SL 0.125 milliGRAM(s) SubLingual every 6 hours PRN nausea  ondansetron    Tablet 4 milliGRAM(s) Oral every 6 hours PRN Nausea

## 2023-01-24 NOTE — DIETITIAN INITIAL EVALUATION ADULT - OTHER INFO
Per H&P, pt is a "16 y/o HF with PMHx of SANDOVAL on CPAP, morbid obesity h/o laparoscopic sleeve gastrectomy on 12/28 presents to [facility] ER reporting 5 day h/o nausea, vomiting, constipation x 3 weeks.  Patient reports ability to tolerate puree foods, baby food prior to last Wednesday, began vomiting which worsened over the course of several days and constipation which prompted a visit to the ER at [facility] where she was evaluated, given IVF and discharged after tolerating clears/water. In ER, CT ABD/PELVIS (1/21) revealed no obstruction.  Patient seen in office today and instructed to go the ER for further evaluation. Patient currently reports nausea, intermittent lightheadedness, david-colored urine, flatus, no BM.  Denies abdominal pain, HA, SOB, CP, dysuria, recent illness."    Nutrition consult ordered for hx bariatric sx.  Pt s/p gastric sleeve 12/28/22, now admitted with constipation and intermittent nausea x 3 weeks and vomiting x few days.  No obstruction per CT a/p.  Per pt, she was unable to have BM since just prior to sleeve gastrectomy sx.  Pt now s/p BM after enema.  Reports she tried stool softener PTA but feels that it made her more nauseous.  Constipation likely multifactorial - increase in protein, limited physical activity, minimal fiber in diet, pain meds post op.  Per pt, she tolerated clear/full liquid phase, progressed to pureed foods.  Had tried soft solids but experienced vomiting; also admits to vomiting x last few days PTA.  Reports consuming adequate fluids (~4x 16 oz water bottle/d).  States she consuming plain isopure protein, adding to fluids and foods.  Reinforced prioritizing protein, adequate hydration.  Encouraged patient to contact bariatric team with any questions/concerns post discharge.  Pt tolerated bariatric clear liquid diet initiation and diet progressed to Bariatric full liquids today.  Pt denies nutrition related questions at time of visit.  RD to follow up and will continue to monitor pt's nutrition status.

## 2023-01-24 NOTE — DIETITIAN INITIAL EVALUATION ADULT - NUTRITIONGOAL OUTCOME1
pt to tolerate liquid diet with progression to bariatric pureed diet; consume adequate fluids/protein; pt to have regular BMs

## 2023-01-24 NOTE — DIETITIAN INITIAL EVALUATION ADULT - ORAL INTAKE PTA/DIET HISTORY
s/p gastric sleeve 12/28/22, had transitioned from liquids to puree phase (unable to progress to soft solids)

## 2023-01-25 ENCOUNTER — TRANSCRIPTION ENCOUNTER (OUTPATIENT)
Age: 18
End: 2023-01-25

## 2023-01-25 VITALS
HEART RATE: 63 BPM | OXYGEN SATURATION: 96 % | TEMPERATURE: 98 F | RESPIRATION RATE: 16 BRPM | SYSTOLIC BLOOD PRESSURE: 105 MMHG | DIASTOLIC BLOOD PRESSURE: 64 MMHG

## 2023-01-25 PROCEDURE — 81001 URINALYSIS AUTO W/SCOPE: CPT

## 2023-01-25 PROCEDURE — 94660 CPAP INITIATION&MGMT: CPT

## 2023-01-25 PROCEDURE — 36415 COLL VENOUS BLD VENIPUNCTURE: CPT

## 2023-01-25 PROCEDURE — 87635 SARS-COV-2 COVID-19 AMP PRB: CPT

## 2023-01-25 PROCEDURE — 80048 BASIC METABOLIC PNL TOTAL CA: CPT

## 2023-01-25 PROCEDURE — 85027 COMPLETE CBC AUTOMATED: CPT

## 2023-01-25 PROCEDURE — 99285 EMERGENCY DEPT VISIT HI MDM: CPT

## 2023-01-25 PROCEDURE — 81025 URINE PREGNANCY TEST: CPT

## 2023-01-25 RX ORDER — ONDANSETRON 8 MG/1
1 TABLET, FILM COATED ORAL
Qty: 0 | Refills: 0 | DISCHARGE

## 2023-01-25 RX ORDER — OMEPRAZOLE 10 MG/1
1 CAPSULE, DELAYED RELEASE ORAL
Qty: 0 | Refills: 0 | DISCHARGE

## 2023-01-25 RX ORDER — NYSTATIN 500MM UNIT
4 POWDER (EA) MISCELLANEOUS
Qty: 112 | Refills: 0
Start: 2023-01-25 | End: 2023-01-31

## 2023-01-25 RX ORDER — ACETAMINOPHEN 500 MG
30 TABLET ORAL
Qty: 0 | Refills: 0 | DISCHARGE

## 2023-01-25 RX ORDER — OMEPRAZOLE 10 MG/1
1 CAPSULE, DELAYED RELEASE ORAL
Qty: 5 | Refills: 0
Start: 2023-01-25 | End: 2023-01-29

## 2023-01-25 RX ORDER — ONDANSETRON 8 MG/1
1 TABLET, FILM COATED ORAL
Qty: 15 | Refills: 0
Start: 2023-01-25 | End: 2023-01-29

## 2023-01-25 RX ORDER — OXYCODONE HYDROCHLORIDE 5 MG/1
1 TABLET ORAL
Qty: 0 | Refills: 0 | DISCHARGE

## 2023-01-25 RX ADMIN — SODIUM CHLORIDE 100 MILLILITER(S): 9 INJECTION, SOLUTION INTRAVENOUS at 09:16

## 2023-01-25 RX ADMIN — Medication 1 MILLIGRAM(S): at 13:39

## 2023-01-25 RX ADMIN — Medication 100 MILLIGRAM(S): at 13:39

## 2023-01-25 RX ADMIN — POLYETHYLENE GLYCOL 3350 17 GRAM(S): 17 POWDER, FOR SOLUTION ORAL at 05:56

## 2023-01-25 RX ADMIN — SODIUM CHLORIDE 100 MILLILITER(S): 9 INJECTION, SOLUTION INTRAVENOUS at 05:56

## 2023-01-25 NOTE — DISCHARGE NOTE PROVIDER - NSDCCPCAREPLAN_GEN_ALL_CORE_FT
PRINCIPAL DISCHARGE DIAGNOSIS  Diagnosis: Constipation  Assessment and Plan of Treatment:       SECONDARY DISCHARGE DIAGNOSES  Diagnosis: S/P bariatric surgery  Assessment and Plan of Treatment:

## 2023-01-25 NOTE — PROGRESS NOTE ADULT - SUBJECTIVE AND OBJECTIVE BOX
Date/Time Patient Seen:  		  Referring MD:   Data Reviewed	       Patient is a 17y old  Female who presents with a chief complaint of Constipation     (24 Jan 2023 11:40)      Subjective/HPI     PAST MEDICAL & SURGICAL HISTORY:  No pertinent past medical history    Obesity, morbid, BMI 50 or higher    SANDOVAL on CPAP    No significant past surgical history    History of sleeve gastrectomy          Medication list         MEDICATIONS  (STANDING):  enoxaparin Injectable 40 milliGRAM(s) SubCutaneous every 24 hours  folic acid Injectable 1 milliGRAM(s) IV Push every 24 hours  lactated ringers 1000 milliLiter(s) (100 mL/Hr) IV Continuous <Continuous>  lactated ringers 1000 milliLiter(s) (100 mL/Hr) IV Continuous <Continuous>  lactated ringers 1000 milliLiter(s) (100 mL/Hr) IV Continuous <Continuous>  polyethylene glycol 3350 17 Gram(s) Oral two times a day  thiamine Injectable 100 milliGRAM(s) IV Push daily    MEDICATIONS  (PRN):  hyoscyamine SL 0.125 milliGRAM(s) SubLingual every 6 hours PRN nausea  ondansetron    Tablet 4 milliGRAM(s) Oral every 6 hours PRN Nausea         Vitals log        ICU Vital Signs Last 24 Hrs  T(C): 36.9 (24 Jan 2023 23:20), Max: 37 (24 Jan 2023 19:00)  T(F): 98.4 (24 Jan 2023 23:20), Max: 98.6 (24 Jan 2023 19:00)  HR: 56 (24 Jan 2023 23:20) (45 - 59)  BP: 115/74 (24 Jan 2023 23:20) (112/76 - 115/74)  BP(mean): --  ABP: --  ABP(mean): --  RR: 16 (24 Jan 2023 23:20) (16 - 20)  SpO2: 100% (24 Jan 2023 23:20) (98% - 100%)    O2 Parameters below as of 24 Jan 2023 07:55  Patient On (Oxygen Delivery Method): room air                 Input and Output:  I&O's Detail      Lab Data                        13.9   6.45  )-----------( 293      ( 23 Jan 2023 14:43 )             43.5     01-23    138  |  97  |  7   ----------------------------<  113<H>  3.5   |  24  |  0.69    Ca    9.5      23 Jan 2023 14:43              Review of Systems	      Objective     Physical Examination    heart s1s2  lung dec BS      Pertinent Lab findings & Imaging      Zaynab:  NO   Adequate UO     I&O's Detail           Discussed with:     Cultures:	        Radiology

## 2023-01-25 NOTE — PROGRESS NOTE ADULT - ASSESSMENT
16 y/o HF with PMHx of SANDOVAL on CPAP, morbid obesity h/o laparoscopic sleeve gastrectomy on 12/28 presents to [facility] ER reporting 5 day h/o nausea, vomiting, constipation x 3 weeks    sandoval  obesity  dyspepsia  sleeve gastrectomy    enc use of CPAP night time  surgery follow up noted    surgery eval noted  CT scan reviewed  hydration  dvt p  mobilize  SANDOVAL - CPAP night time use - Sleep hygiene -   monitor VS and HD

## 2023-01-25 NOTE — DISCHARGE NOTE PROVIDER - CARE PROVIDER_API CALL
Pippa Celis (MD)  Surgery  [address] [place] [address]  Phone: [phone]  Fax: [phone]  Follow Up Time:

## 2023-01-25 NOTE — DISCHARGE NOTE PROVIDER - NSDCFUADDINST_GEN_ALL_CORE_FT
Please continue to walk and ambulate frequently throughout the day.   Please go to pharmacy to  omeprazole and Zofran that was prescribed for you.   Also buy Miralax and take twice a day.    Please continue to walk and ambulate frequently throughout the day.   Please go to [facility] pharmacy to  Omeprazole, Zofran, nystatin that was prescribed for you.   Also buy Miralax and take twice a day.

## 2023-01-25 NOTE — DISCHARGE NOTE NURSING/CASE MANAGEMENT/SOCIAL WORK - NSDCFUADDAPPT_GEN_ALL_CORE_FT
Please call Dr. YOLETTE Celis's office ([phone]) to schedule a follow-up appointment to be seen in 1 week.

## 2023-01-25 NOTE — CARE COORDINATION ASSESSMENT. - NSPASTMEDSURGHISTORY_GEN_ALL_CORE_FT
PAST MEDICAL & SURGICAL HISTORY:  SANDOVAL on CPAP      Obesity, morbid, BMI 50 or higher      History of sleeve gastrectomy      
No

## 2023-01-25 NOTE — PROGRESS NOTE ADULT - SUBJECTIVE AND OBJECTIVE BOX
HPI:   18yo F s/p sleeve (12/28). Pt seen and examined at the bedside.  States she feels well today. Denies n/v. Had another BM after enema.   Patient is ambulating on the floor. She is tolerating full liquid diet.     VITALS:  Vital Signs Last 24 Hrs  T(C): 36.7 (25 Jan 2023 08:01), Max: 37 (24 Jan 2023 19:00)  T(F): 98.1 (25 Jan 2023 08:01), Max: 98.6 (24 Jan 2023 19:00)  HR: 63 (25 Jan 2023 08:01) (54 - 63)  BP: 105/64 (25 Jan 2023 08:01) (105/64 - 115/74)  BP(mean): --  RR: 16 (25 Jan 2023 08:01) (16 - 18)  SpO2: 96% (25 Jan 2023 08:01) (96% - 100%)  Parameters below as of 25 Jan 2023 08:01  Patient On (Oxygen Delivery Method): room air    LABS:                        13.9   6.45  )-----------( 293      ( 23 Jan 2023 14:43 )             43.5   01-23    138  |  97  |  7   ----------------------------<  113<H>  3.5   |  24  |  0.69    Ca    9.5      23 Jan 2023 14:43      Physical Exam:  General: A/O x 3, NAD, resting comfortably in bed  Abdominal: soft, ND, BS X 4.   Incisions: well healed  Extremities: no edema, no calf tenderness B/L    A/P:   17y year old Female s/p lap sleeve on 12/28/22. Present to ER due to n/v and constipation on 1/23/23.   +BMs, +flatus     Cont GI/DVT prophylaxis.   Cont  OOB/ambulation on floor / incentive spirometry.  Diet- fulls  Miralax BID    d/c planning for today.   Case and plan D/W Dr. Celis

## 2023-01-25 NOTE — DISCHARGE NOTE PROVIDER - NSDCMRMEDTOKEN_GEN_ALL_CORE_FT
acetaminophen 500 mg/15 mL oral liquid: 30 milliliter(s) orally every 8 hours for a minimum of 3 days and a maximum of 5 days  omeprazole 20 mg oral delayed release capsule: 1 cap(s) orally once a day open capsule and sprinkle on spoon of yogurt or pudding  ondansetron 4 mg oral tablet, disintegratin tab(s) orally 3 times a day, As Needed - for nausea  oxyCODONE 5 mg oral tablet: 1 tab(s) orally every 6 hours, As Needed - for severe pain   nystatin 100,000 units/mL oral suspension: 4 milliliter(s) orally 4 times a day MDD:16 ml/day total  omeprazole 20 mg oral delayed release capsule: 1 cap(s) orally once a day MDD:1 Open Capsule and sprinkle in yogurt/pudding  ondansetron 4 mg oral tablet, disintegratin tab(s) orally 3 times a day, As Needed -for nausea MDD:3

## 2023-01-25 NOTE — DISCHARGE NOTE PROVIDER - CARE PROVIDERS DIRECT ADDRESSES
[email or web address]

## 2023-01-25 NOTE — CARE COORDINATION ASSESSMENT. - NSCAREPROVIDERS_GEN_ALL_CORE_FT
CARE PROVIDERS:  Accepting Physician: Pippa Celis  Admitting: Pippa Celis  Attending: Pippa Celis  Case Management: Gurpreet Parada  Case Management: Leonila Corley  Consultant: Melanie Abreu  Consultant: Preston Teran  Consultant: Lillie Schaefer  Consultant: Pippa Celis  Consultant: Jennifer Rubio  ED ACP: Bessie Joseph  ED Attending: Terrell Hernandez  ED Nurse: Janelle Triana  Nurse: Erika Padilla  Ordered: Terrell Hernandez  PCA/Nursing Assistant: Dinora Watts  Registered Dietitian: Roseanne Gonzales  Respiratory Therapy: Dustin Muñoz  Respiratory Therapy: Jose D Jenkins  : Maria Dolores Galeana// Supp. Assoc.: Macie Viramontes

## 2023-01-25 NOTE — DISCHARGE NOTE NURSING/CASE MANAGEMENT/SOCIAL WORK - PATIENT PORTAL LINK FT
You can access the FollowMyHealth Patient Portal offered by [facility] by registering at the following website: [email or web address]. By joining [employer]’s FollowMyHealth portal, you will also be able to view your health information using other applications (apps) compatible with our system. Stable

## 2023-01-25 NOTE — DISCHARGE NOTE PROVIDER - NSDCFUSCHEDAPPT_GEN_ALL_CORE_FT
Pippa Celis  [facility] Physician [facility]  BARIATRIC [address]  Scheduled Appointment: 01/30/2023    [facility]  WEIGHTMGMT [address]  Scheduled Appointment: 02/28/2023     Pippa Celis  [facility] Physician [facility]  BARIATRIC [address]  Scheduled Appointment: 01/31/2023    [facility]  WEIGHTMGMT [address]  Scheduled Appointment: 02/28/2023

## 2023-01-25 NOTE — DISCHARGE NOTE NURSING/CASE MANAGEMENT/SOCIAL WORK - NSDCPEFALRISK_GEN_ALL_CORE
For information on Fall & Injury Prevention, visit: [email or web address]  [email or web address] OR  https://www.cdc.gov/steadi/patient.html

## 2023-01-25 NOTE — DISCHARGE NOTE PROVIDER - NSDCFUADDAPPT_GEN_ALL_CORE_FT
Please call Dr. YOLETTE Celis's office ([phone]) to schedule a follow-up appointment to be seen in 1 week.

## 2023-01-25 NOTE — DISCHARGE NOTE PROVIDER - HOSPITAL COURSE
AGE y/o female with PMHx of laparoscopic sleeve gastrectomy on 12/28/22 admitted to hospital on 1/23/23 for constipation, nausea/vomiting. Patient received fluids with vitamins and enemas. Patient has multiple bowel movements and symptoms improved. Currently tolerating bariatric diet, voiding independently, having bowel movements and ambulating. Discharged on 1/25/23 with bowel regimen, reflux medicaiton, and antinausea medication. Patient to follow-up with Dr. Celis on 1/30/23.   Pt is a 16 y/o female with PMHx of laparoscopic sleeve gastrectomy on 12/28/22 admitted to hospital on 1/23/23 for constipation, nausea/vomiting. Patient received fluids with vitamins and enemas. Patient has multiple bowel movements and symptoms improved. Currently tolerating bariatric diet, voiding independently, having bowel movements and ambulating. Discharged on 1/25/23 with bowel regimen, reflux medicaiton, and antinausea medication. Patient to follow-up with Dr. Celis on 1/30/23.

## 2023-01-26 ENCOUNTER — NON-APPOINTMENT (OUTPATIENT)
Age: 18
End: 2023-01-26

## 2023-01-26 RX ORDER — OMEPRAZOLE 20 MG/1
20 CAPSULE, DELAYED RELEASE ORAL DAILY
Qty: 30 | Refills: 1 | Status: DISCONTINUED | COMMUNITY
Start: 2022-12-11 | End: 2023-01-26

## 2023-01-26 RX ORDER — HYOSCYAMINE SULFATE 0.12 MG/1
0.12 TABLET, ORALLY DISINTEGRATING ORAL 4 TIMES DAILY
Qty: 60 | Refills: 1 | Status: DISCONTINUED | COMMUNITY
Start: 2023-01-19 | End: 2023-01-26

## 2023-01-31 ENCOUNTER — APPOINTMENT (OUTPATIENT)
Dept: BARIATRICS | Facility: CLINIC | Age: 18
End: 2023-01-31
Payer: MEDICAID

## 2023-01-31 VITALS
DIASTOLIC BLOOD PRESSURE: 74 MMHG | HEART RATE: 105 BPM | OXYGEN SATURATION: 99 % | BODY MASS INDEX: 41.36 KG/M2 | HEIGHT: 68 IN | TEMPERATURE: 96.7 F | SYSTOLIC BLOOD PRESSURE: 118 MMHG | WEIGHT: 272.93 LBS

## 2023-01-31 PROBLEM — B37.0 ORAL THRUSH: Status: ACTIVE | Noted: 2023-01-31

## 2023-01-31 PROCEDURE — 99024 POSTOP FOLLOW-UP VISIT: CPT

## 2023-02-03 ENCOUNTER — APPOINTMENT (OUTPATIENT)
Dept: BARIATRICS | Facility: CLINIC | Age: 18
End: 2023-02-03

## 2023-02-06 NOTE — HISTORY OF PRESENT ILLNESS
[Surgeon Name:   ___] : Surgeon Name: Dr. ARSHAD [Pre-Op Weight ___] : Pre-op weight was [unfilled] lbs [Date of Surgery: ___] : Date of Surgery:   [unfilled] [Procedure: ___] : Procedure performed: [unfilled]  [de-identified] : 17 year old woman 1 month s/p Laparoscopic Sleeve Gastrectomy. Patient is doing well. Reports no abdominal pain, nausea/vomiting, constipation, diarrhea, reflux/heartburn. Patient trying to eat 3 protein-rich meals/day, drinking adequate zero-calorie fluids/day, taking bariatric vitamins, and ambulating for exercise. \par \par Next nutritionist appointment 2/28/2023

## 2023-02-06 NOTE — PHYSICAL EXAM
[Obese, well nourished, in no acute distress] : obese, well nourished, in no acute distress [Normal] : affect appropriate [de-identified] : soft, NT, ND, no evidence of hernia or diastasis, incisions healing well

## 2023-02-06 NOTE — ASSESSMENT
[FreeTextEntry1] : 17 year old woman 1 month s/p Laparoscopic Sleeve Gastrectomy. Patient doing well. Nutrition and exercise guidelines reviewed with the patient. \par \par Continue protein-focused meals, 3 meals a day (aim for 60 g protein/day), and reduce number of protein drinks per day as increase number of protein-focused meals per day.\par Encourage zero calorie fluid intake (64 oz/day)\par Continue bariatric vitamins \par Exercise with cardio (aim for 8k-10k steps/day), and begin strength training 2x/week at 6 weeks post op. \par Use step counter\par Weigh yourself 1x-2x/week\par Follow-up with nutritionist \par Continue MiraLAX, for constipation\par Follow-up in one month\par All questions answered\par \par Call with any questions or concerns\par

## 2023-02-28 ENCOUNTER — APPOINTMENT (OUTPATIENT)
Dept: BARIATRICS/WEIGHT MGMT | Facility: CLINIC | Age: 18
End: 2023-02-28
Payer: MEDICAID

## 2023-02-28 DIAGNOSIS — E66.01 MORBID (SEVERE) OBESITY DUE TO EXCESS CALORIES: ICD-10-CM

## 2023-02-28 PROCEDURE — 97803 MED NUTRITION INDIV SUBSEQ: CPT | Mod: 95

## 2023-03-02 VITALS — WEIGHT: 270 LBS | BODY MASS INDEX: 40.92 KG/M2 | HEIGHT: 68 IN

## 2023-03-02 PROBLEM — E66.01 MORBID OBESITY: Status: ACTIVE | Noted: 2020-12-15

## 2023-03-10 DIAGNOSIS — R79.9 ABNORMAL FINDING OF BLOOD CHEMISTRY, UNSPECIFIED: ICD-10-CM

## 2023-03-10 DIAGNOSIS — Z86.39 PERSONAL HISTORY OF OTHER ENDOCRINE, NUTRITIONAL AND METABOLIC DISEASE: ICD-10-CM

## 2023-03-10 DIAGNOSIS — R63.8 OTHER SYMPTOMS AND SIGNS CONCERNING FOOD AND FLUID INTAKE: ICD-10-CM

## 2023-03-10 DIAGNOSIS — E46 UNSPECIFIED PROTEIN-CALORIE MALNUTRITION: ICD-10-CM

## 2023-03-10 DIAGNOSIS — K90.9 INTESTINAL MALABSORPTION, UNSPECIFIED: ICD-10-CM

## 2023-03-22 NOTE — PATIENT PROFILE ADULT - NSPRESCRALCSIXMORE_GEN_A_NUR
"  Outpatient Psychiatry Follow-Up Visit     3/22/2023      Clinical Status of Patient:  Outpatient (Ambulatory)    Chief Complaint:  Iris Johnson is a 27 y.o. female who presents today for follow-up of mood and inattention/distractibility .      Preferred Name: Iris  Gender Identity: cis female  Preferred Pronouns: she/her    Impressions/Plan from last visit: Iris attended her visit. Since we last met, she learned that she is pregnant and had stopped Lamictal and Adderall. She is 10 weeks tomorrow. She has been taking Seroquel. She has been concerned about her migraines--will be able to continue with botox, in the second semester. She had to take a leave from her work because she "could not focus or drive". She has no patience--has been more irritable. She is not able to get things done; easily forgetful. She has been emotionally labile, even during this visit. She is concerned about not having much interest in sex--we spent some time discussing roles and interest, etc.--also likelihood that her prior "hypersexuality" was related to hypomania. She is also planning to breastfeed. She reported that she was manic recently and "completely snapped"--high irritability, agitation, crying, racing thoughts, etc. During this visit, emotionally labile--cried at different times throughout.  I consulted with both Dr. Perez and Sabas regarding adding her Lamictal back to treat her bipolar.  Team members concurred that her stability at this point is of paramount importance. See plan below--will follow her monthly throughout her pregnancy.    Plan--continue Seroquel 300 mg hs, restart Lamictal 25 mg daily for 2 weeks, then 50 mg daily for 2 weeks, then 100 mg     Interval History and Content of Current Session: Iris attended her visit. She is taking Seroquel, Lamictal, and prenatal vitamins. She feels fairly stable. Her knee is hurting today (from a past injury). She said that her  has told her that she complains " "about her "brain being broken" but she is not complaining about it. She relates this to being off of Adderall. She has been off of it since December. She said that she has not been able to focus--is struggling. She often feels "frozen" and "can't get my mind to work right." She said, "I need somebody there to keep me on track." She is currently taking Lamictal 100 mg--will be taking 200 mg next week. She is feeling frustrated and overwhelmed. She does not feel that her  is understanding or helpful. She has talked to a common friend (with her ) about coming over--may also have her mom and best friend over, too, though her best friend has been struggling with her own mental health person. Her  reportedly has strict boundaries/limits with what he will put up with and not with her. Her best friend's mom does not think that her  has been good for her--"she says that I have lost my sense of self." She agrees that she has. She said that he is upset that he is carrying the financial "burden"--she does not feel that she could work with the pregnancy. There are red flags in her relationship--she is not on their bank account, for example.     Plan--continue Seroquel 300 mg hs, Lamictal 200 mg once she starts    PSYCHOTHERAPY      Site: [facility]  Time: 20 minutes  Participants: Met with patient    Therapeutic Intervention Type: behavior modifying psychotherapy, supportive psychotherapy  Why chosen therapy is appropriate versus another modality: patient responds to this modality, evidence based practice    Target symptoms: anxiety , mood swings, pregnancy  Primary focus: see above    Outcome monitoring methods: self-report, observation    Patient's response to intervention:  The patient's response to intervention is accepting.    Progress toward goals:  The patient's progress toward goals is fair .    GAD7 3/22/2023 2/14/2023 11/16/2022   1. Feeling nervous, anxious, or on edge? 1 3 1 "   2. Not being able to stop or control worrying? 1 3 1   3. Worrying too much about different things? 1 3 1   4. Trouble relaxing? 1 3 1   5. Being so restless that it is hard to sit still? 1 3 1   6. Becoming easily annoyed or irritable? 1 3 0   7. Feeling afraid as if something awful might happen? 1 1 1   NOLVIA-7 Score 7 19 6      0-4 = Minimal anxiety  5-9 = Mild anxiety  10-14 = Moderate anxiety  15-21 = Severe anxiety       Depression Patient Health Questionnaire 8/1/2022   Over the last two weeks how often have you been bothered by little interest or pleasure in doing things Several days   Over the last two weeks how often have you been bothered by feeling down, depressed or hopeless Several days   PHQ-2 Total Score 2     0-4 = No intervention  5 to 9 = Mild  10 to 14 = Moderate  15 to 19 = Moderately severe  =20 = Severe    Review of Systems   PSYCHIATRIC: Pertinant items are noted in the narrative.    Past Medical, Family and Social History: The patient's past medical, family and social history have been reviewed and updated as appropriate within the electronic medical record - see encounter notes.      Current Outpatient Medications:     butalbital-acetaminophen-caffeine -40 mg (FIORICET, ESGIC) -40 mg per tablet, Take 1 tablet by mouth every 6 (six) hours as needed for Headaches., Disp: 30 tablet, Rfl: 0    lamoTRIgine (LAMICTAL) 200 MG tablet, Take 1 tablet (200 mg total) by mouth every morning., Disp: 30 tablet, Rfl: 2    ondansetron (ZOFRAN) 4 MG tablet, Take 1 tablet (4 mg total) by mouth every 8 (eight) hours as needed for Nausea., Disp: 15 tablet, Rfl: 6    PRENATAL VITAMIN 27 mg iron- 0.8 mg Tab, Take 1 tablet by mouth., Disp: , Rfl:     QUEtiapine (SEROQUEL) 300 MG Tab, Take 1 tablet (300 mg total) by mouth every evening., Disp: 90 tablet, Rfl: 3    Current Facility-Administered Medications:     onabotulinumtoxina injection 200 Units, 200 Units, Intramuscular, q12 weeks, Deb STEEN  Timothy, NP, 200 Units at 03/06/23 1524    Compliance: yes    Side effects: see above    Risk Parameters:  Patient reports no suicidal ideation  Patient reports no homicidal ideation  Patient reports no self-injurious behavior  Patient reports no violent behavior    Exam (detailed: at least 9 elements; comprehensive: all 15 elements)   Constitutional  Vitals:  Most recent vital signs were reviewed.   Last 3 sets of Vitals    Vitals - 1 value per visit 3/6/2023 3/21/2023 3/22/2023   SYSTOLIC 107 138 106   DIASTOLIC 68 70 69   Pulse 90 - 99   Temp - - -   Resp 17 - -   SPO2 - - -   Weight (lb) 111 124.56 125.22   Weight (kg) 50.349 56.5 56.8   Height 60 - -   BMI (Calculated) 21.7 - -   VISIT REPORT - - -   Pain Score  - - -          General:  age appropriate, casually dressed, neatly groomed, hair pulled up     Musculoskeletal  Muscle Strength/Tone:  no tremor, no tic   Gait & Station:  Some difficulty with walking at times--complained of knee pain     Psychiatric  Speech:  no latency; no press   Behavior: wnl   Mood & Affect:  anxious, depressed  labile   Thought Process:  normal and logical   Associations:  intact   Thought Content:  normal, no suicidality, no homicidality, delusions, or paranoia   Insight:  has awareness of illness   Judgement: behavior is adequate to circumstances   Orientation:  grossly intact   Memory: intact for content of interview   Language: grossly intact   Attention Span & Concentration:  Grossly intact   Fund of Knowledge:  intact and appropriate to age and level of education     Assessment and Diagnosis   Status/Progress: Based on the examination today, the patient's problem(s) is/are adequately but not ideally controlled.  New problems have not been presented today.   Co-morbidities and psychosocial stressors  are complicating management of the primary condition.  There are no active rule-out diagnoses for this patient at this time.     General Impression:     Encounter Diagnoses   Name  Primary?    Bipolar disease during pregnancy in second trimester Yes    Bipolar I disorder, most recent episode (or current) mixed     Attention deficit hyperactivity disorder (ADHD), predominantly inattentive type          Intervention/Counseling/Treatment Plan   Medication Management: Discussed risks, benefits, and alternatives to treatment plan documented above with patient. I answered all patient questions related to this plan, and patient expressed understanding and agreement.   continue Seroquel 300 mg hs, Lamictal 200 mg (3/28)  Counseling needed but restricted by time/resources--will follow monthly  Friend to take her to change name  Friend to be with her to get house in order  Consider couple counseling      Return to Clinic: as scheduled    Medication List with Changes/Refills   Current Medications    BUTALBITAL-ACETAMINOPHEN-CAFFEINE -40 MG (FIORICET, ESGIC) -40 MG PER TABLET    Take 1 tablet by mouth every 6 (six) hours as needed for Headaches.    ONDANSETRON (ZOFRAN) 4 MG TABLET    Take 1 tablet (4 mg total) by mouth every 8 (eight) hours as needed for Nausea.    PRENATAL VITAMIN 27 MG IRON- 0.8 MG TAB    Take 1 tablet by mouth.    QUETIAPINE (SEROQUEL) 300 MG TAB    Take 1 tablet (300 mg total) by mouth every evening.   Changed and/or Refilled Medications    Modified Medication Previous Medication    LAMOTRIGINE (LAMICTAL) 200 MG TABLET lamoTRIgine (LAMICTAL) 100 MG tablet       Take 1 tablet (200 mg total) by mouth every morning.    Take 1 tablet (100 mg total) by mouth every morning for 14 days, THEN 2 tablets (200 mg total) every morning for 14 days.   Discontinued Medications    LAMOTRIGINE (LAMICTAL) 25 MG TABLET    Take 1 tablet (25 mg total) by mouth every morning for 14 days, THEN 2 tablets (50 mg total) every morning for 14 days.        I spent an additional 18 minutes performing E/M services with >50% spent on counseling, guidance, coordinating care (not Psychotherapy related) in  addition to the 20 minutes performing Psychotherapy.    Time spent with pt including note preparation: 38 minutes     Tiffanie Galvez, PhD, MP  Advanced Practice Medical Psychologist  [facility]  [address].  [place] [id number] [phone]   [phone] fax     Never

## 2023-03-28 ENCOUNTER — NON-APPOINTMENT (OUTPATIENT)
Age: 18
End: 2023-03-28

## 2023-04-07 RX ORDER — NYSTATIN 100000 [USP'U]/ML
100000 SUSPENSION ORAL 4 TIMES DAILY
Qty: 140 | Refills: 0 | Status: DISCONTINUED | COMMUNITY
Start: 2023-01-31 | End: 2023-04-07

## 2023-04-07 RX ORDER — OMEPRAZOLE 20 MG/1
20 CAPSULE, DELAYED RELEASE ORAL DAILY
Qty: 30 | Refills: 3 | Status: DISCONTINUED | COMMUNITY
Start: 2023-01-26 | End: 2023-04-07

## 2023-04-07 RX ORDER — HYOSCYAMINE SULFATE 0.12 MG/1
0.12 TABLET, ORALLY DISINTEGRATING ORAL 4 TIMES DAILY
Qty: 60 | Refills: 1 | Status: DISCONTINUED | COMMUNITY
Start: 2023-01-26 | End: 2023-04-07

## 2023-04-10 ENCOUNTER — APPOINTMENT (OUTPATIENT)
Dept: BARIATRICS | Facility: CLINIC | Age: 18
End: 2023-04-10
Payer: COMMERCIAL

## 2023-04-10 VITALS — BODY MASS INDEX: 38.19 KG/M2 | WEIGHT: 252 LBS | HEIGHT: 68 IN

## 2023-04-10 DIAGNOSIS — Z98.84 BARIATRIC SURGERY STATUS: ICD-10-CM

## 2023-04-10 DIAGNOSIS — Z99.89 OBSTRUCTIVE SLEEP APNEA (ADULT) (PEDIATRIC): ICD-10-CM

## 2023-04-10 DIAGNOSIS — G47.33 OBSTRUCTIVE SLEEP APNEA (ADULT) (PEDIATRIC): ICD-10-CM

## 2023-04-10 PROCEDURE — 99214 OFFICE O/P EST MOD 30 MIN: CPT | Mod: 95

## 2023-04-10 RX ORDER — MULTIVIT-MIN/IRON/FOLIC ACID/K 45-800-120
CAPSULE ORAL DAILY
Refills: 0 | Status: ACTIVE | COMMUNITY

## 2023-04-10 RX ORDER — CALCIUM CARBONATE/VITAMIN D3 500 MG-2.5
TABLET,CHEWABLE ORAL DAILY
Refills: 0 | Status: ACTIVE | COMMUNITY

## 2023-04-10 RX ORDER — CYANOCOBALAMIN (VITAMIN B-12) 2500 MCG
TABLET, SUBLINGUAL SUBLINGUAL DAILY
Refills: 0 | Status: ACTIVE | COMMUNITY

## 2023-04-10 NOTE — REASON FOR VISIT
[Home] : at home, [unfilled] , at the time of the visit. [Medical Office: ([facility])___] : at the medical office located in  [Post Operative Visit] : a post operative visit for [S/P Bariatric Surgery] : s/p bariatric surgery

## 2023-04-13 NOTE — PHYSICAL EXAM
[Obese, well nourished, in no acute distress] : obese, well nourished, in no acute distress [Normal] : affect appropriate [de-identified] : soft, NT, ND, no evidence of hernia or diastasis, incisions healing well

## 2023-04-13 NOTE — HISTORY OF PRESENT ILLNESS
[Procedure: ___] : Procedure performed: [unfilled]  [Date of Surgery: ___] : Date of Surgery:   [unfilled] [Surgeon Name:   ___] : Surgeon Name: Dr. ARSHAD [Pre-Op Weight ___] : Pre-op weight was [unfilled] lbs [de-identified] : 17 year old woman 3 months s/p Laparoscopic Sleeve Gastrectomy. Patient is doing well and has lost 20 lbs since last visit. Reports no abdominal pain, nausea/vomiting, constipation, diarrhea, reflux/heartburn. Patient trying to eat 3 protein-rich meals/day with no snacking. Drinking adequate zero-calorie fluids/day, taking bariatric vitamins, and pole dancing/ going to the gym 3-4 days/week for exercise. C/O hair loss and irregular periods.\par \par Last nutritionist appointment 2/28/2023

## 2023-04-13 NOTE — ASSESSMENT
[FreeTextEntry1] : 17 year old woman 3 months s/p Laparoscopic Sleeve Gastrectomy. Patient is doing well and has lost 20 lbs since last visit. Reports no abdominal pain, nausea/vomiting, constipation, diarrhea, reflux/heartburn.  C/O hair loss and irregular periods.\par \par \par Continue protein-focused meals, 3 meals a day (aim for 60 g protein/day)\par Encourage zero calorie fluid intake (64 oz/day)\par Continue bariatric vitamins and Biotin 5000 mcg/ daily OTC  for hair loss\par Exercise with cardio (aim for 8k- 10 k steps/day), and strength training 2 x /week \par Continue MiraLAX, for constipation along with smooth move tea\par Rx for labs given to pt\par \par Follow-up in 3 months\par All questions answered\par \par Call with any questions or concerns\par

## 2023-04-14 ENCOUNTER — NON-APPOINTMENT (OUTPATIENT)
Age: 18
End: 2023-04-14

## 2023-05-12 ENCOUNTER — EMERGENCY (EMERGENCY)
Facility: HOSPITAL | Age: 18
LOS: 0 days | Discharge: HOME | End: 2023-05-12
Attending: STUDENT IN AN ORGANIZED HEALTH CARE EDUCATION/TRAINING PROGRAM
Payer: COMMERCIAL

## 2023-05-12 VITALS
WEIGHT: 238.54 LBS | TEMPERATURE: 98 F | SYSTOLIC BLOOD PRESSURE: 132 MMHG | HEART RATE: 88 BPM | OXYGEN SATURATION: 99 % | RESPIRATION RATE: 20 BRPM | DIASTOLIC BLOOD PRESSURE: 81 MMHG

## 2023-05-12 DIAGNOSIS — R42 DIZZINESS AND GIDDINESS: ICD-10-CM

## 2023-05-12 DIAGNOSIS — Z98.84 BARIATRIC SURGERY STATUS: ICD-10-CM

## 2023-05-12 DIAGNOSIS — R51.9 HEADACHE, UNSPECIFIED: ICD-10-CM

## 2023-05-12 DIAGNOSIS — Z90.3 ACQUIRED ABSENCE OF STOMACH [PART OF]: Chronic | ICD-10-CM

## 2023-05-12 DIAGNOSIS — Z91.013 ALLERGY TO SEAFOOD: ICD-10-CM

## 2023-05-12 DIAGNOSIS — H53.8 OTHER VISUAL DISTURBANCES: ICD-10-CM

## 2023-05-12 LAB
ALBUMIN SERPL ELPH-MCNC: 4.5 G/DL — SIGNIFICANT CHANGE UP (ref 3.5–5.2)
ALP SERPL-CCNC: 78 U/L — SIGNIFICANT CHANGE UP (ref 30–115)
ALT FLD-CCNC: 10 U/L — LOW (ref 14–37)
ANION GAP SERPL CALC-SCNC: 13 MMOL/L — SIGNIFICANT CHANGE UP (ref 7–14)
AST SERPL-CCNC: 13 U/L — LOW (ref 14–37)
BASOPHILS # BLD AUTO: 0.04 K/UL — SIGNIFICANT CHANGE UP (ref 0–0.2)
BASOPHILS NFR BLD AUTO: 0.5 % — SIGNIFICANT CHANGE UP (ref 0–1)
BILIRUB SERPL-MCNC: 0.6 MG/DL — SIGNIFICANT CHANGE UP (ref 0.2–1.2)
BUN SERPL-MCNC: 10 MG/DL — SIGNIFICANT CHANGE UP (ref 10–20)
CALCIUM SERPL-MCNC: 9.3 MG/DL — SIGNIFICANT CHANGE UP (ref 8.4–10.5)
CHLORIDE SERPL-SCNC: 103 MMOL/L — SIGNIFICANT CHANGE UP (ref 98–110)
CO2 SERPL-SCNC: 22 MMOL/L — SIGNIFICANT CHANGE UP (ref 17–32)
CREAT SERPL-MCNC: 0.7 MG/DL — SIGNIFICANT CHANGE UP (ref 0.3–1)
EGFR: 128 ML/MIN/1.73M2 — SIGNIFICANT CHANGE UP
EOSINOPHIL # BLD AUTO: 0.11 K/UL — SIGNIFICANT CHANGE UP (ref 0–0.7)
EOSINOPHIL NFR BLD AUTO: 1.5 % — SIGNIFICANT CHANGE UP (ref 0–8)
GLUCOSE SERPL-MCNC: 90 MG/DL — SIGNIFICANT CHANGE UP (ref 70–99)
HCG SERPL QL: NEGATIVE — SIGNIFICANT CHANGE UP
HCT VFR BLD CALC: 38.9 % — SIGNIFICANT CHANGE UP (ref 37–47)
HGB BLD-MCNC: 12.5 G/DL — SIGNIFICANT CHANGE UP (ref 12–16)
HIV 1 & 2 AB SERPL IA.RAPID: SIGNIFICANT CHANGE UP
IMM GRANULOCYTES NFR BLD AUTO: 0.4 % — HIGH (ref 0.1–0.3)
LYMPHOCYTES # BLD AUTO: 2.19 K/UL — SIGNIFICANT CHANGE UP (ref 1.2–3.4)
LYMPHOCYTES # BLD AUTO: 29.1 % — SIGNIFICANT CHANGE UP (ref 20.5–51.1)
MAGNESIUM SERPL-MCNC: 2.1 MG/DL — SIGNIFICANT CHANGE UP (ref 1.8–2.4)
MCHC RBC-ENTMCNC: 28.3 PG — SIGNIFICANT CHANGE UP (ref 27–31)
MCHC RBC-ENTMCNC: 32.1 G/DL — SIGNIFICANT CHANGE UP (ref 32–37)
MCV RBC AUTO: 88 FL — SIGNIFICANT CHANGE UP (ref 81–99)
MONOCYTES # BLD AUTO: 0.43 K/UL — SIGNIFICANT CHANGE UP (ref 0.1–0.6)
MONOCYTES NFR BLD AUTO: 5.7 % — SIGNIFICANT CHANGE UP (ref 1.7–9.3)
NEUTROPHILS # BLD AUTO: 4.73 K/UL — SIGNIFICANT CHANGE UP (ref 1.4–6.5)
NEUTROPHILS NFR BLD AUTO: 62.8 % — SIGNIFICANT CHANGE UP (ref 42.2–75.2)
NRBC # BLD: 0 /100 WBCS — SIGNIFICANT CHANGE UP (ref 0–0)
PLATELET # BLD AUTO: 351 K/UL — SIGNIFICANT CHANGE UP (ref 130–400)
PMV BLD: 11 FL — HIGH (ref 7.4–10.4)
POTASSIUM SERPL-MCNC: 4.6 MMOL/L — SIGNIFICANT CHANGE UP (ref 3.5–5)
POTASSIUM SERPL-SCNC: 4.6 MMOL/L — SIGNIFICANT CHANGE UP (ref 3.5–5)
PROT SERPL-MCNC: 7.5 G/DL — SIGNIFICANT CHANGE UP (ref 6.1–8)
RBC # BLD: 4.42 M/UL — SIGNIFICANT CHANGE UP (ref 4.2–5.4)
RBC # FLD: 13.9 % — SIGNIFICANT CHANGE UP (ref 11.5–14.5)
SODIUM SERPL-SCNC: 138 MMOL/L — SIGNIFICANT CHANGE UP (ref 135–146)
WBC # BLD: 7.53 K/UL — SIGNIFICANT CHANGE UP (ref 4.8–10.8)
WBC # FLD AUTO: 7.53 K/UL — SIGNIFICANT CHANGE UP (ref 4.8–10.8)

## 2023-05-12 PROCEDURE — 84703 CHORIONIC GONADOTROPIN ASSAY: CPT

## 2023-05-12 PROCEDURE — 85025 COMPLETE CBC W/AUTO DIFF WBC: CPT

## 2023-05-12 PROCEDURE — 96374 THER/PROPH/DIAG INJ IV PUSH: CPT

## 2023-05-12 PROCEDURE — 99284 EMERGENCY DEPT VISIT MOD MDM: CPT

## 2023-05-12 PROCEDURE — 96375 TX/PRO/DX INJ NEW DRUG ADDON: CPT

## 2023-05-12 PROCEDURE — 87491 CHLMYD TRACH DNA AMP PROBE: CPT

## 2023-05-12 PROCEDURE — 87798 DETECT AGENT NOS DNA AMP: CPT

## 2023-05-12 PROCEDURE — 86780 TREPONEMA PALLIDUM: CPT

## 2023-05-12 PROCEDURE — 99284 EMERGENCY DEPT VISIT MOD MDM: CPT | Mod: 25

## 2023-05-12 PROCEDURE — 87801 DETECT AGNT MULT DNA AMPLI: CPT

## 2023-05-12 PROCEDURE — 80053 COMPREHEN METABOLIC PANEL: CPT

## 2023-05-12 PROCEDURE — 86703 HIV-1/HIV-2 1 RESULT ANTBDY: CPT

## 2023-05-12 PROCEDURE — 87591 N.GONORRHOEAE DNA AMP PROB: CPT

## 2023-05-12 PROCEDURE — 87661 TRICHOMONAS VAGINALIS AMPLIF: CPT

## 2023-05-12 PROCEDURE — 83735 ASSAY OF MAGNESIUM: CPT

## 2023-05-12 PROCEDURE — 36415 COLL VENOUS BLD VENIPUNCTURE: CPT

## 2023-05-12 RX ORDER — KETOROLAC TROMETHAMINE 30 MG/ML
15 SYRINGE (ML) INJECTION ONCE
Refills: 0 | Status: DISCONTINUED | OUTPATIENT
Start: 2023-05-12 | End: 2023-05-12

## 2023-05-12 RX ORDER — METOCLOPRAMIDE HCL 10 MG
10 TABLET ORAL ONCE
Refills: 0 | Status: COMPLETED | OUTPATIENT
Start: 2023-05-12 | End: 2023-05-12

## 2023-05-12 RX ORDER — SODIUM CHLORIDE 9 MG/ML
1000 INJECTION, SOLUTION INTRAVENOUS ONCE
Refills: 0 | Status: COMPLETED | OUTPATIENT
Start: 2023-05-12 | End: 2023-05-12

## 2023-05-12 RX ADMIN — Medication 15 MILLIGRAM(S): at 14:44

## 2023-05-12 RX ADMIN — SODIUM CHLORIDE 1000 MILLILITER(S): 9 INJECTION, SOLUTION INTRAVENOUS at 12:25

## 2023-05-12 RX ADMIN — Medication 10 MILLIGRAM(S): at 12:09

## 2023-05-12 NOTE — ED ADULT NURSE NOTE - NSFALLUNIVINTERV_ED_ALL_ED
Bed/Stretcher in lowest position, wheels locked, appropriate side rails in place/Call bell, personal items and telephone in reach/Instruct patient to call for assistance before getting out of bed/chair/stretcher/Non-slip footwear applied when patient is off stretcher/[place] to call system/Physically safe environment - no spills, clutter or unnecessary equipment/Purposeful proactive rounding/Room/bathroom lighting operational, light cord in reach

## 2023-05-12 NOTE — ED PROVIDER NOTE - CARE PROVIDER_API CALL
Lazaro Vázquez)  Pediatrics  [facility] [place] [address]  Phone: [phone]  Fax: [phone]  Follow Up Time:

## 2023-05-12 NOTE — ED PROVIDER NOTE - PATIENT PORTAL LINK FT
You can access the FollowMyHealth Patient Portal offered by [facility] by registering at the following website: [email or web address]. By joining [employer]’s FollowMyHealth portal, you will also be able to view your health information using other applications (apps) compatible with our system.
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
## 2023-05-12 NOTE — ED PROVIDER NOTE - PHYSICAL EXAMINATION
General: Calm, quiet spoken, talkative  HEENT: NCAT, PERRL, oropharynx without erythema or exudates, hypertrophied tonsils, TMs non-bulging, non-erythematous, moist mucous membranes.  RESP: CTAB, no increased work of breathing.  CVS: S1, S2, no murmurs, cap refill <2 sec, 2+ peripheral pulses.  ABD: (+) BS, soft, NTND, no masses.  MSK: FROM in all extremities, no tenderness, no deformities.  NEURO: Normal tone, no obvious deficits.  SKIN: Warm, dry, well-perfused, no rashes, post op laproscopic scars from bariatric surgery well healed.
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
## 2023-05-12 NOTE — ED ADULT NURSE NOTE - ALCOHOL PRE SCREEN (AUDIT - C)
JENSM to schedule pt for 5 month follow-up (around 5/7/21) with Dr Chi via video   Statement Selected

## 2023-05-13 LAB — T PALLIDUM AB TITR SER: NEGATIVE — SIGNIFICANT CHANGE UP

## 2023-05-17 LAB
A VAGINAE DNA VAG QL NAA+PROBE: SIGNIFICANT CHANGE UP
BVAB2 DNA VAG QL NAA+PROBE: SIGNIFICANT CHANGE UP
C ALBICANS DNA VAG QL NAA+PROBE: NEGATIVE — SIGNIFICANT CHANGE UP
C GLABRATA DNA VAG QL NAA+PROBE: NEGATIVE — SIGNIFICANT CHANGE UP
C KRUSEI DNA VAG QL NAA+PROBE: NEGATIVE — SIGNIFICANT CHANGE UP
C LUSITANIAE DNA VAG QL NAA+PROBE: NEGATIVE — SIGNIFICANT CHANGE UP
C TRACH RRNA SPEC QL NAA+PROBE: NEGATIVE — SIGNIFICANT CHANGE UP
CANDIDA DNA VAG QL NAA+PROBE: NEGATIVE — SIGNIFICANT CHANGE UP
MEGA1 DNA VAG QL NAA+PROBE: SIGNIFICANT CHANGE UP
N GONORRHOEA RRNA SPEC QL NAA+PROBE: NEGATIVE — SIGNIFICANT CHANGE UP
T VAGINALIS RRNA SPEC QL NAA+PROBE: NEGATIVE — SIGNIFICANT CHANGE UP

## 2023-06-07 ENCOUNTER — EMERGENCY (EMERGENCY)
Facility: HOSPITAL | Age: 18
LOS: 0 days | Discharge: HOME | End: 2023-06-07
Attending: PEDIATRICS
Payer: COMMERCIAL

## 2023-06-07 VITALS
SYSTOLIC BLOOD PRESSURE: 137 MMHG | WEIGHT: 231.93 LBS | HEART RATE: 92 BPM | HEIGHT: 69 IN | TEMPERATURE: 99 F | RESPIRATION RATE: 17 BRPM | OXYGEN SATURATION: 100 % | DIASTOLIC BLOOD PRESSURE: 65 MMHG

## 2023-06-07 DIAGNOSIS — Z91.013 ALLERGY TO SEAFOOD: ICD-10-CM

## 2023-06-07 DIAGNOSIS — Z99.89 DEPENDENCE ON OTHER ENABLING MACHINES AND DEVICES: ICD-10-CM

## 2023-06-07 DIAGNOSIS — F41.9 ANXIETY DISORDER, UNSPECIFIED: ICD-10-CM

## 2023-06-07 DIAGNOSIS — R91.8 OTHER NONSPECIFIC ABNORMAL FINDING OF LUNG FIELD: ICD-10-CM

## 2023-06-07 DIAGNOSIS — R07.89 OTHER CHEST PAIN: ICD-10-CM

## 2023-06-07 DIAGNOSIS — G47.33 OBSTRUCTIVE SLEEP APNEA (ADULT) (PEDIATRIC): ICD-10-CM

## 2023-06-07 DIAGNOSIS — R05.8 OTHER SPECIFIED COUGH: ICD-10-CM

## 2023-06-07 DIAGNOSIS — Z90.3 ACQUIRED ABSENCE OF STOMACH [PART OF]: Chronic | ICD-10-CM

## 2023-06-07 LAB
ALBUMIN SERPL ELPH-MCNC: 4.4 G/DL — SIGNIFICANT CHANGE UP (ref 3.5–5.2)
ALP SERPL-CCNC: 79 U/L — SIGNIFICANT CHANGE UP (ref 30–115)
ALT FLD-CCNC: 8 U/L — LOW (ref 14–37)
ANION GAP SERPL CALC-SCNC: 12 MMOL/L — SIGNIFICANT CHANGE UP (ref 7–14)
APPEARANCE UR: ABNORMAL
AST SERPL-CCNC: 11 U/L — LOW (ref 14–37)
BACTERIA # UR AUTO: ABNORMAL
BASOPHILS # BLD AUTO: 0.05 K/UL — SIGNIFICANT CHANGE UP (ref 0–0.2)
BASOPHILS NFR BLD AUTO: 0.6 % — SIGNIFICANT CHANGE UP (ref 0–1)
BILIRUB SERPL-MCNC: 0.5 MG/DL — SIGNIFICANT CHANGE UP (ref 0.2–1.2)
BILIRUB UR-MCNC: NEGATIVE — SIGNIFICANT CHANGE UP
BUN SERPL-MCNC: 8 MG/DL — LOW (ref 10–20)
CALCIUM SERPL-MCNC: 9.7 MG/DL — SIGNIFICANT CHANGE UP (ref 8.4–10.4)
CHLORIDE SERPL-SCNC: 98 MMOL/L — SIGNIFICANT CHANGE UP (ref 98–110)
CO2 SERPL-SCNC: 25 MMOL/L — SIGNIFICANT CHANGE UP (ref 17–32)
COLOR SPEC: YELLOW — SIGNIFICANT CHANGE UP
CREAT SERPL-MCNC: 0.6 MG/DL — SIGNIFICANT CHANGE UP (ref 0.3–1)
DIFF PNL FLD: NEGATIVE — SIGNIFICANT CHANGE UP
EGFR: 133 ML/MIN/1.73M2 — SIGNIFICANT CHANGE UP
EOSINOPHIL # BLD AUTO: 0.1 K/UL — SIGNIFICANT CHANGE UP (ref 0–0.7)
EOSINOPHIL NFR BLD AUTO: 1.2 % — SIGNIFICANT CHANGE UP (ref 0–8)
EPI CELLS # UR: >27 /HPF — HIGH (ref 0–5)
GLUCOSE SERPL-MCNC: 77 MG/DL — SIGNIFICANT CHANGE UP (ref 70–99)
GLUCOSE UR QL: NEGATIVE — SIGNIFICANT CHANGE UP
HCT VFR BLD CALC: 38.9 % — SIGNIFICANT CHANGE UP (ref 37–47)
HGB BLD-MCNC: 12.4 G/DL — SIGNIFICANT CHANGE UP (ref 12–16)
HYALINE CASTS # UR AUTO: 78 /LPF — HIGH (ref 0–7)
IMM GRANULOCYTES NFR BLD AUTO: 0.4 % — HIGH (ref 0.1–0.3)
KETONES UR-MCNC: ABNORMAL
LEUKOCYTE ESTERASE UR-ACNC: NEGATIVE — SIGNIFICANT CHANGE UP
LIDOCAIN IGE QN: 19 U/L — SIGNIFICANT CHANGE UP (ref 7–60)
LYMPHOCYTES # BLD AUTO: 2.16 K/UL — SIGNIFICANT CHANGE UP (ref 1.2–3.4)
LYMPHOCYTES # BLD AUTO: 25.4 % — SIGNIFICANT CHANGE UP (ref 20.5–51.1)
MAGNESIUM SERPL-MCNC: 2 MG/DL — SIGNIFICANT CHANGE UP (ref 1.8–2.4)
MCHC RBC-ENTMCNC: 28.2 PG — SIGNIFICANT CHANGE UP (ref 27–31)
MCHC RBC-ENTMCNC: 31.9 G/DL — LOW (ref 32–37)
MCV RBC AUTO: 88.6 FL — SIGNIFICANT CHANGE UP (ref 81–99)
MONOCYTES # BLD AUTO: 0.54 K/UL — SIGNIFICANT CHANGE UP (ref 0.1–0.6)
MONOCYTES NFR BLD AUTO: 6.4 % — SIGNIFICANT CHANGE UP (ref 1.7–9.3)
NEUTROPHILS # BLD AUTO: 5.61 K/UL — SIGNIFICANT CHANGE UP (ref 1.4–6.5)
NEUTROPHILS NFR BLD AUTO: 66 % — SIGNIFICANT CHANGE UP (ref 42.2–75.2)
NITRITE UR-MCNC: NEGATIVE — SIGNIFICANT CHANGE UP
NRBC # BLD: 0 /100 WBCS — SIGNIFICANT CHANGE UP (ref 0–0)
PH UR: 6 — SIGNIFICANT CHANGE UP (ref 5–8)
PLATELET # BLD AUTO: 396 K/UL — SIGNIFICANT CHANGE UP (ref 130–400)
PMV BLD: 11.3 FL — HIGH (ref 7.4–10.4)
POTASSIUM SERPL-MCNC: 4.8 MMOL/L — SIGNIFICANT CHANGE UP (ref 3.5–5)
POTASSIUM SERPL-SCNC: 4.8 MMOL/L — SIGNIFICANT CHANGE UP (ref 3.5–5)
PROT SERPL-MCNC: 7.3 G/DL — SIGNIFICANT CHANGE UP (ref 6.1–8)
PROT UR-MCNC: ABNORMAL
RBC # BLD: 4.39 M/UL — SIGNIFICANT CHANGE UP (ref 4.2–5.4)
RBC # FLD: 13.3 % — SIGNIFICANT CHANGE UP (ref 11.5–14.5)
RBC CASTS # UR COMP ASSIST: 0 /HPF — SIGNIFICANT CHANGE UP (ref 0–4)
SODIUM SERPL-SCNC: 135 MMOL/L — SIGNIFICANT CHANGE UP (ref 135–146)
SP GR SPEC: 1.02 — SIGNIFICANT CHANGE UP (ref 1.01–1.03)
TROPONIN T SERPL-MCNC: <0.01 NG/ML — SIGNIFICANT CHANGE UP
UROBILINOGEN FLD QL: ABNORMAL
WBC # BLD: 8.49 K/UL — SIGNIFICANT CHANGE UP (ref 4.8–10.8)
WBC # FLD AUTO: 8.49 K/UL — SIGNIFICANT CHANGE UP (ref 4.8–10.8)
WBC UR QL: 2 /HPF — SIGNIFICANT CHANGE UP (ref 0–5)

## 2023-06-07 PROCEDURE — 87591 N.GONORRHOEAE DNA AMP PROB: CPT

## 2023-06-07 PROCEDURE — 93010 ELECTROCARDIOGRAM REPORT: CPT

## 2023-06-07 PROCEDURE — 99285 EMERGENCY DEPT VISIT HI MDM: CPT

## 2023-06-07 PROCEDURE — 36415 COLL VENOUS BLD VENIPUNCTURE: CPT

## 2023-06-07 PROCEDURE — 99285 EMERGENCY DEPT VISIT HI MDM: CPT | Mod: 25

## 2023-06-07 PROCEDURE — 84484 ASSAY OF TROPONIN QUANT: CPT

## 2023-06-07 PROCEDURE — 81001 URINALYSIS AUTO W/SCOPE: CPT

## 2023-06-07 PROCEDURE — 71045 X-RAY EXAM CHEST 1 VIEW: CPT | Mod: 26

## 2023-06-07 PROCEDURE — 83735 ASSAY OF MAGNESIUM: CPT

## 2023-06-07 PROCEDURE — 85025 COMPLETE CBC W/AUTO DIFF WBC: CPT

## 2023-06-07 PROCEDURE — 96374 THER/PROPH/DIAG INJ IV PUSH: CPT

## 2023-06-07 PROCEDURE — 93005 ELECTROCARDIOGRAM TRACING: CPT

## 2023-06-07 PROCEDURE — 71045 X-RAY EXAM CHEST 1 VIEW: CPT

## 2023-06-07 PROCEDURE — 87491 CHLMYD TRACH DNA AMP PROBE: CPT

## 2023-06-07 PROCEDURE — 80053 COMPREHEN METABOLIC PANEL: CPT

## 2023-06-07 PROCEDURE — 83690 ASSAY OF LIPASE: CPT

## 2023-06-07 RX ORDER — KETOROLAC TROMETHAMINE 30 MG/ML
15 SYRINGE (ML) INJECTION ONCE
Refills: 0 | Status: DISCONTINUED | OUTPATIENT
Start: 2023-06-07 | End: 2023-06-07

## 2023-06-07 RX ORDER — ACETAMINOPHEN 500 MG
975 TABLET ORAL ONCE
Refills: 0 | Status: COMPLETED | OUTPATIENT
Start: 2023-06-07 | End: 2023-06-07

## 2023-06-07 RX ADMIN — Medication 15 MILLIGRAM(S): at 15:28

## 2023-06-07 RX ADMIN — Medication 975 MILLIGRAM(S): at 18:08

## 2023-06-07 NOTE — ED PROVIDER NOTE - PHYSICAL EXAMINATION
GENERAL: Well-developed; well-nourished; in no acute distress.   SKIN: warm, dry  HEAD: Normocephalic; atraumatic.  EYES: PERRLA, EOMI, no conjunctival erythema  ENT: No nasal discharge; airway clear.  NECK: Supple; non tender.  CHEST: No chest wall tenderness  CARD: S1, S2 normal; no murmurs, gallops, or rubs. Regular rate and rhythm.   RESP: LCTAB; No wheezes, rales, rhonchi, or stridor.  ABD: soft, nontender, and nondistended  EXT: Normal ROM.  No LE TTP or edema bilaterally.  LYMPH: No acute cervical adenopathy.  NEURO: Alert, oriented, grossly unremarkable  PSYCH: Cooperative, appropriate.

## 2023-06-07 NOTE — ED PROVIDER NOTE - PATIENT PORTAL LINK FT
You can access the FollowMyHealth Patient Portal offered by [facility] by registering at the following website: [email or web address]. By joining [employer]’s FollowMyHealth portal, you will also be able to view your health information using other applications (apps) compatible with our system.
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
## 2023-06-07 NOTE — ED PROVIDER NOTE - WR INTERPRETATION 1
CXR negative - No infiltrates, No consolidation, No atelectasis seenCXR negative - No tracheal deviation, No pneumothorax, No subdiaphragmatic
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
## 2023-06-07 NOTE — ED PROVIDER NOTE - PROGRESS NOTE DETAILS
X-ray results discussed with radiology and patient aware of peritracheal stripe and significance.  Advise close follow-up with PMD and surgical team.
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
## 2023-06-07 NOTE — ED PROVIDER NOTE - CARE PROVIDER_API CALL
Chai Pineda  Psychiatry  [address] [place] [address]  Phone: [phone]  Fax: [phone]  Follow Up Time: 1-3 Days

## 2023-06-07 NOTE — ED PROVIDER NOTE - NSFOLLOWUPINSTRUCTIONS_ED_ALL_ED_FT
Our Emergency Department Referral Coordinators will be reaching out ot you in the next 24-48 hours from 9:00am to 5:00pm (Monday to Friday) with a follow up appointment. Please expect a phone call from the hospital in that time frame. If you do not receive a call or if you have any questions or concerns, you can reach them at [phone] or [phone].     Chest Pain    Chest pain can be caused by many different conditions which may or may not be dangerous. Causes include heartburn, lung infections, heart attack, blood clot in lungs, skin infections, strain or damage to muscle, cartilage, or bones, etc. Lab tests or other studies including an electrocardiogram (EKG) may have been performed to find the cause of your pain. Make sure to follow up with a cardiologist or as instructed by your health care professional.    SEEK IMMEDIATE MEDICAL CARE IF YOU HAVE THE FOLLOWING SYMPTOMS: worsening chest pain, coughing up blood, unexplained back/neck/jaw pain, severe abdominal pain, dizziness or lightheadedness, shortness of breath, sweaty or clammy skin, vomiting, or racing heart beat. These symptoms may represent a serious problem that is an emergency. Do not wait to see if the symptoms will go away. Get medical help right away. Call your local emergency services (911 in the [place] Do not drive yourself to the hospital.      Anxiety    Generalized anxiety disorder (LATA) is a mental disorder. It is defined as anxiety that is not necessarily related to specific events or activities or is out of proportion to said events. Symptoms include restlessness, fatigue, difficulty concentrations, irritability and difficulty concentrating. It interferes with life functions, including relationships, work, and school. If you were started on a medication make sure to take exactly as prescribed and follow up with a psychiatrist.    SEEK IMMEDIATE MEDICAL CARE IF YOU HAVE THE FOLLOWING SYMPTOMS: thoughts about hurting killing yourself, thoughts about hurting or killing somebody else, hallucinations, or worsening depression.

## 2023-06-08 LAB
C TRACH RRNA SPEC QL NAA+PROBE: SIGNIFICANT CHANGE UP
N GONORRHOEA RRNA SPEC QL NAA+PROBE: SIGNIFICANT CHANGE UP
SPECIMEN SOURCE: SIGNIFICANT CHANGE UP

## 2023-06-30 ENCOUNTER — NON-APPOINTMENT (OUTPATIENT)
Age: 18
End: 2023-06-30

## 2023-07-04 ENCOUNTER — EMERGENCY (EMERGENCY)
Facility: HOSPITAL | Age: 18
LOS: 0 days | Discharge: HOME | End: 2023-07-04
Attending: PEDIATRICS
Payer: COMMERCIAL

## 2023-07-04 VITALS
SYSTOLIC BLOOD PRESSURE: 149 MMHG | DIASTOLIC BLOOD PRESSURE: 94 MMHG | HEART RATE: 86 BPM | OXYGEN SATURATION: 99 % | RESPIRATION RATE: 18 BRPM | TEMPERATURE: 99 F | WEIGHT: 224.87 LBS

## 2023-07-04 VITALS
WEIGHT: 224.87 LBS | TEMPERATURE: 99 F | HEIGHT: 60.8 IN | DIASTOLIC BLOOD PRESSURE: 61 MMHG | HEART RATE: 85 BPM | RESPIRATION RATE: 18 BRPM | SYSTOLIC BLOOD PRESSURE: 116 MMHG | OXYGEN SATURATION: 98 %

## 2023-07-04 DIAGNOSIS — R30.0 DYSURIA: ICD-10-CM

## 2023-07-04 DIAGNOSIS — G47.33 OBSTRUCTIVE SLEEP APNEA (ADULT) (PEDIATRIC): ICD-10-CM

## 2023-07-04 DIAGNOSIS — N89.8 OTHER SPECIFIED NONINFLAMMATORY DISORDERS OF VAGINA: ICD-10-CM

## 2023-07-04 DIAGNOSIS — Z98.84 BARIATRIC SURGERY STATUS: ICD-10-CM

## 2023-07-04 DIAGNOSIS — L29.2 PRURITUS VULVAE: ICD-10-CM

## 2023-07-04 DIAGNOSIS — Z91.013 ALLERGY TO SEAFOOD: ICD-10-CM

## 2023-07-04 DIAGNOSIS — Z90.3 ACQUIRED ABSENCE OF STOMACH [PART OF]: Chronic | ICD-10-CM

## 2023-07-04 LAB
APPEARANCE UR: ABNORMAL
BACTERIA # UR AUTO: ABNORMAL
BILIRUB UR-MCNC: NEGATIVE — SIGNIFICANT CHANGE UP
COLOR SPEC: SIGNIFICANT CHANGE UP
DIFF PNL FLD: NEGATIVE — SIGNIFICANT CHANGE UP
EPI CELLS # UR: 13 /HPF — HIGH (ref 0–5)
GLUCOSE UR QL: NEGATIVE — SIGNIFICANT CHANGE UP
HYALINE CASTS # UR AUTO: 5 /LPF — SIGNIFICANT CHANGE UP (ref 0–7)
KETONES UR-MCNC: NEGATIVE — SIGNIFICANT CHANGE UP
LEUKOCYTE ESTERASE UR-ACNC: ABNORMAL
NITRITE UR-MCNC: NEGATIVE — SIGNIFICANT CHANGE UP
PH UR: 7 — SIGNIFICANT CHANGE UP (ref 5–8)
PROT UR-MCNC: SIGNIFICANT CHANGE UP
RBC CASTS # UR COMP ASSIST: 1 /HPF — SIGNIFICANT CHANGE UP (ref 0–4)
SP GR SPEC: 1.02 — SIGNIFICANT CHANGE UP (ref 1.01–1.03)
UROBILINOGEN FLD QL: ABNORMAL
WBC UR QL: 77 /HPF — HIGH (ref 0–5)

## 2023-07-04 PROCEDURE — 81001 URINALYSIS AUTO W/SCOPE: CPT

## 2023-07-04 PROCEDURE — 87086 URINE CULTURE/COLONY COUNT: CPT

## 2023-07-04 PROCEDURE — 99283 EMERGENCY DEPT VISIT LOW MDM: CPT

## 2023-07-04 PROCEDURE — 99284 EMERGENCY DEPT VISIT MOD MDM: CPT

## 2023-07-04 RX ORDER — METRONIDAZOLE 7.5 MG/G
1 GEL VAGINAL
Qty: 1 | Refills: 0
Start: 2023-07-04 | End: 2023-07-08

## 2023-07-04 NOTE — ED PROVIDER NOTE - NSFOLLOWUPINSTRUCTIONS_ED_ALL_ED_FT
Contact a health care provider if:  You have a fever.  Your symptoms go away and then return.  Your symptoms do not get better with treatment.  Your symptoms get worse.  You have new symptoms.  You develop blisters in or around your vagina.  You have blood coming from your vagina and it is not your menstrual period.  You develop pain in your abdomen.

## 2023-07-04 NOTE — ED PEDIATRIC TRIAGE NOTE - CHIEF COMPLAINT QUOTE
Patient presents to ED c/o frequent yeast infection. reports experiencing s/s again.   Patient reports that she is on keflex
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
## 2023-07-04 NOTE — ED PROVIDER NOTE - OBJECTIVE STATEMENT
17Y F c/o vulvovaginal pruritis. Per pt, she was seen in urgent care 3 days ago where she was diagnosed with a UTI and candidal vulvovaginitis. Pt completed 2 doses of oral fluconazole and is taking cephalexin. Pt is concerned that her symptoms have not improved. C/o burning with urination 2/2 excoriations. Denies fever, suprapubic pain, hematuria.

## 2023-07-04 NOTE — ED PROVIDER NOTE - PHYSICAL EXAMINATION
CONSTITUTIONAL: Comfortable, NAD  EYES: Non-icteric sclera, nl conjunctiva  ENT: No nasal discharge; MMM  CARDIAC: RRR, S1, S2; no murmurs, no rubs, no gallops  RESP: No accessory muscle use; CTAB, no wheezing, no rales  ABD: Soft, NT, ND, no guarding, no rebound tenderness, +BS  : mild white vaginal discharge  NEUROMSK: Moving all extremities  PSYCH: Alert, cooperative, appropriate

## 2023-07-04 NOTE — ED PEDIATRIC NURSE NOTE - OBJECTIVE STATEMENT
Pt c/o UTi x 2 days w reoccurring yeast infections x 3 months. Pt states she started abx about 2 days ago but is still experiencing dysuria and vaginal itching. Denies hematuria, n/v/d

## 2023-07-04 NOTE — ED PROVIDER NOTE - CLINICAL SUMMARY MEDICAL DECISION MAKING FREE TEXT BOX
pt with vaginal pruritis and white discharge. Completed 2 doses of diflucan. Prescribed metrogel. Outpt GYn follow up.

## 2023-07-04 NOTE — ED PROVIDER NOTE - PATIENT PORTAL LINK FT
You can access the FollowMyHealth Patient Portal offered by [facility] by registering at the following website: [email or web address]. By joining [email or web address]’s FollowMyHealth portal, you will also be able to view your health information using other applications (apps) compatible with our system.

## 2023-07-05 LAB
CULTURE RESULTS: SIGNIFICANT CHANGE UP
SPECIMEN SOURCE: SIGNIFICANT CHANGE UP

## 2023-07-14 ENCOUNTER — EMERGENCY (EMERGENCY)
Facility: HOSPITAL | Age: 18
LOS: 1 days | Discharge: HOME | End: 2023-07-14
Attending: STUDENT IN AN ORGANIZED HEALTH CARE EDUCATION/TRAINING PROGRAM
Payer: COMMERCIAL

## 2023-07-14 VITALS
RESPIRATION RATE: 16 BRPM | OXYGEN SATURATION: 100 % | TEMPERATURE: 98 F | HEART RATE: 72 BPM | SYSTOLIC BLOOD PRESSURE: 123 MMHG | DIASTOLIC BLOOD PRESSURE: 79 MMHG

## 2023-07-14 VITALS
DIASTOLIC BLOOD PRESSURE: 79 MMHG | TEMPERATURE: 98 F | WEIGHT: 223.11 LBS | OXYGEN SATURATION: 99 % | HEART RATE: 76 BPM | SYSTOLIC BLOOD PRESSURE: 120 MMHG | RESPIRATION RATE: 16 BRPM | HEIGHT: 60.8 IN

## 2023-07-14 DIAGNOSIS — Z90.3 ACQUIRED ABSENCE OF STOMACH [PART OF]: Chronic | ICD-10-CM

## 2023-07-14 LAB
ALBUMIN SERPL ELPH-MCNC: 3.8 G/DL — SIGNIFICANT CHANGE UP (ref 3.3–5)
ALP SERPL-CCNC: 79 U/L — SIGNIFICANT CHANGE UP (ref 30–120)
ALT FLD-CCNC: 13 U/L DA — SIGNIFICANT CHANGE UP (ref 10–60)
ANION GAP SERPL CALC-SCNC: 8 MMOL/L — SIGNIFICANT CHANGE UP (ref 5–17)
APPEARANCE UR: CLEAR — SIGNIFICANT CHANGE UP
APTT BLD: 34.4 SEC — SIGNIFICANT CHANGE UP (ref 27.5–35.5)
AST SERPL-CCNC: 12 U/L — SIGNIFICANT CHANGE UP (ref 10–40)
BASOPHILS # BLD AUTO: 0.04 K/UL — SIGNIFICANT CHANGE UP (ref 0–0.2)
BASOPHILS NFR BLD AUTO: 0.5 % — SIGNIFICANT CHANGE UP (ref 0–2)
BILIRUB SERPL-MCNC: 0.3 MG/DL — SIGNIFICANT CHANGE UP (ref 0.2–1.2)
BILIRUB UR-MCNC: NEGATIVE — SIGNIFICANT CHANGE UP
BUN SERPL-MCNC: 9 MG/DL — SIGNIFICANT CHANGE UP (ref 7–23)
CALCIUM SERPL-MCNC: 9.6 MG/DL — SIGNIFICANT CHANGE UP (ref 8.4–10.5)
CHLORIDE SERPL-SCNC: 104 MMOL/L — SIGNIFICANT CHANGE UP (ref 96–108)
CO2 SERPL-SCNC: 29 MMOL/L — SIGNIFICANT CHANGE UP (ref 22–31)
COLOR SPEC: YELLOW — SIGNIFICANT CHANGE UP
CREAT SERPL-MCNC: 0.68 MG/DL — SIGNIFICANT CHANGE UP (ref 0.5–1.3)
DIFF PNL FLD: ABNORMAL
EGFR: 129 ML/MIN/1.73M2 — SIGNIFICANT CHANGE UP
EOSINOPHIL # BLD AUTO: 0.08 K/UL — SIGNIFICANT CHANGE UP (ref 0–0.5)
EOSINOPHIL NFR BLD AUTO: 1 % — SIGNIFICANT CHANGE UP (ref 0–6)
GLUCOSE SERPL-MCNC: 100 MG/DL — HIGH (ref 70–99)
GLUCOSE UR QL: NEGATIVE MG/DL — SIGNIFICANT CHANGE UP
HCG SERPL-ACNC: <1 MIU/ML — SIGNIFICANT CHANGE UP
HCT VFR BLD CALC: 37.2 % — SIGNIFICANT CHANGE UP (ref 34.5–45)
HGB BLD-MCNC: 12.2 G/DL — SIGNIFICANT CHANGE UP (ref 11.5–15.5)
IMM GRANULOCYTES NFR BLD AUTO: 0.1 % — SIGNIFICANT CHANGE UP (ref 0–0.9)
INR BLD: 1.32 RATIO — HIGH (ref 0.88–1.16)
KETONES UR-MCNC: 15 MG/DL
LACTATE SERPL-SCNC: 0.6 MMOL/L — LOW (ref 0.7–2)
LEUKOCYTE ESTERASE UR-ACNC: ABNORMAL
LIDOCAIN IGE QN: 54 U/L — LOW (ref 73–393)
LYMPHOCYTES # BLD AUTO: 2.13 K/UL — SIGNIFICANT CHANGE UP (ref 1–3.3)
LYMPHOCYTES # BLD AUTO: 27.6 % — SIGNIFICANT CHANGE UP (ref 13–44)
MCHC RBC-ENTMCNC: 28.9 PG — SIGNIFICANT CHANGE UP (ref 27–34)
MCHC RBC-ENTMCNC: 32.8 GM/DL — SIGNIFICANT CHANGE UP (ref 32–36)
MCV RBC AUTO: 88.2 FL — SIGNIFICANT CHANGE UP (ref 80–100)
MONOCYTES # BLD AUTO: 0.49 K/UL — SIGNIFICANT CHANGE UP (ref 0–0.9)
MONOCYTES NFR BLD AUTO: 6.3 % — SIGNIFICANT CHANGE UP (ref 2–14)
NEUTROPHILS # BLD AUTO: 4.97 K/UL — SIGNIFICANT CHANGE UP (ref 1.8–7.4)
NEUTROPHILS NFR BLD AUTO: 64.5 % — SIGNIFICANT CHANGE UP (ref 43–77)
NITRITE UR-MCNC: NEGATIVE — SIGNIFICANT CHANGE UP
NRBC # BLD: 0 /100 WBCS — SIGNIFICANT CHANGE UP (ref 0–0)
PH UR: 6.5 — SIGNIFICANT CHANGE UP (ref 5–8)
PLATELET # BLD AUTO: 345 K/UL — SIGNIFICANT CHANGE UP (ref 150–400)
POTASSIUM SERPL-MCNC: 4.1 MMOL/L — SIGNIFICANT CHANGE UP (ref 3.5–5.3)
POTASSIUM SERPL-SCNC: 4.1 MMOL/L — SIGNIFICANT CHANGE UP (ref 3.5–5.3)
PROT SERPL-MCNC: 7.7 G/DL — SIGNIFICANT CHANGE UP (ref 6–8.3)
PROT UR-MCNC: NEGATIVE MG/DL — SIGNIFICANT CHANGE UP
PROTHROM AB SERPL-ACNC: 15.6 SEC — HIGH (ref 10.5–13.4)
RBC # BLD: 4.22 M/UL — SIGNIFICANT CHANGE UP (ref 3.8–5.2)
RBC # FLD: 13.4 % — SIGNIFICANT CHANGE UP (ref 10.3–14.5)
SODIUM SERPL-SCNC: 141 MMOL/L — SIGNIFICANT CHANGE UP (ref 135–145)
SP GR SPEC: 1.02 — SIGNIFICANT CHANGE UP (ref 1–1.03)
UROBILINOGEN FLD QL: 2 MG/DL (ref 0.2–1)
WBC # BLD: 7.72 K/UL — SIGNIFICANT CHANGE UP (ref 3.8–10.5)
WBC # FLD AUTO: 7.72 K/UL — SIGNIFICANT CHANGE UP (ref 3.8–10.5)

## 2023-07-14 PROCEDURE — 81001 URINALYSIS AUTO W/SCOPE: CPT

## 2023-07-14 PROCEDURE — 99285 EMERGENCY DEPT VISIT HI MDM: CPT

## 2023-07-14 PROCEDURE — 74177 CT ABD & PELVIS W/CONTRAST: CPT | Mod: 26,MA

## 2023-07-14 PROCEDURE — 99243 OFF/OP CNSLTJ NEW/EST LOW 30: CPT

## 2023-07-14 PROCEDURE — 96361 HYDRATE IV INFUSION ADD-ON: CPT

## 2023-07-14 PROCEDURE — 85025 COMPLETE CBC W/AUTO DIFF WBC: CPT

## 2023-07-14 PROCEDURE — 85610 PROTHROMBIN TIME: CPT

## 2023-07-14 PROCEDURE — 96375 TX/PRO/DX INJ NEW DRUG ADDON: CPT

## 2023-07-14 PROCEDURE — 36415 COLL VENOUS BLD VENIPUNCTURE: CPT

## 2023-07-14 PROCEDURE — 83690 ASSAY OF LIPASE: CPT

## 2023-07-14 PROCEDURE — 74177 CT ABD & PELVIS W/CONTRAST: CPT | Mod: MA

## 2023-07-14 PROCEDURE — 80053 COMPREHEN METABOLIC PANEL: CPT

## 2023-07-14 PROCEDURE — 99284 EMERGENCY DEPT VISIT MOD MDM: CPT | Mod: 25

## 2023-07-14 PROCEDURE — 84702 CHORIONIC GONADOTROPIN TEST: CPT

## 2023-07-14 PROCEDURE — 96374 THER/PROPH/DIAG INJ IV PUSH: CPT | Mod: XU

## 2023-07-14 PROCEDURE — 85730 THROMBOPLASTIN TIME PARTIAL: CPT

## 2023-07-14 PROCEDURE — 83605 ASSAY OF LACTIC ACID: CPT

## 2023-07-14 RX ORDER — FAMOTIDINE 10 MG/ML
1 INJECTION INTRAVENOUS
Qty: 60 | Refills: 0
Start: 2023-07-14 | End: 2023-08-12

## 2023-07-14 RX ORDER — IOHEXOL 300 MG/ML
30 INJECTION, SOLUTION INTRAVENOUS ONCE
Refills: 0 | Status: COMPLETED | OUTPATIENT
Start: 2023-07-14 | End: 2023-07-14

## 2023-07-14 RX ORDER — METOCLOPRAMIDE HCL 10 MG
10 TABLET ORAL ONCE
Refills: 0 | Status: COMPLETED | OUTPATIENT
Start: 2023-07-14 | End: 2023-07-14

## 2023-07-14 RX ORDER — SODIUM CHLORIDE 9 MG/ML
1000 INJECTION INTRAMUSCULAR; INTRAVENOUS; SUBCUTANEOUS ONCE
Refills: 0 | Status: COMPLETED | OUTPATIENT
Start: 2023-07-14 | End: 2023-07-14

## 2023-07-14 RX ORDER — FAMOTIDINE 10 MG/ML
20 INJECTION INTRAVENOUS ONCE
Refills: 0 | Status: COMPLETED | OUTPATIENT
Start: 2023-07-14 | End: 2023-07-14

## 2023-07-14 RX ORDER — ONDANSETRON 8 MG/1
4 TABLET, FILM COATED ORAL ONCE
Refills: 0 | Status: COMPLETED | OUTPATIENT
Start: 2023-07-14 | End: 2023-07-14

## 2023-07-14 RX ORDER — ONDANSETRON 8 MG/1
1 TABLET, FILM COATED ORAL
Qty: 8 | Refills: 0
Start: 2023-07-14

## 2023-07-14 RX ADMIN — SODIUM CHLORIDE 1000 MILLILITER(S): 9 INJECTION INTRAMUSCULAR; INTRAVENOUS; SUBCUTANEOUS at 15:46

## 2023-07-14 RX ADMIN — Medication 10 MILLIGRAM(S): at 17:50

## 2023-07-14 RX ADMIN — IOHEXOL 30 MILLILITER(S): 300 INJECTION, SOLUTION INTRAVENOUS at 15:46

## 2023-07-14 RX ADMIN — FAMOTIDINE 20 MILLIGRAM(S): 10 INJECTION INTRAVENOUS at 17:50

## 2023-07-14 RX ADMIN — SODIUM CHLORIDE 1000 MILLILITER(S): 9 INJECTION INTRAMUSCULAR; INTRAVENOUS; SUBCUTANEOUS at 16:46

## 2023-07-14 RX ADMIN — ONDANSETRON 4 MILLIGRAM(S): 8 TABLET, FILM COATED ORAL at 15:46

## 2023-07-14 NOTE — ED ADULT NURSE NOTE - NSFALLUNIVINTERV_ED_ALL_ED
Bed/Stretcher in lowest position, wheels locked, appropriate side rails in place/Call bell, personal items and telephone in reach/Instruct patient to call for assistance before getting out of bed/chair/stretcher/Non-slip footwear applied when patient is off stretcher/[place] to call system/Physically safe environment - no spills, clutter or unnecessary equipment/Purposeful proactive rounding/Room/bathroom lighting operational, light cord in reach

## 2023-07-14 NOTE — CONSULT NOTE ADULT - ASSESSMENT
17F s/p laparoscopic sleeve gastrectomy in  here with nausea and dehydration  -labs  -UA  -HCG  -CT with IV/PO contrast  -IVF resuscitation  -d/w patient and all questions answered    d/w Dr. Escobedo (on call for bariatric group) - will f/u results  d/w ED

## 2023-07-14 NOTE — ED PROVIDER NOTE - PATIENT PORTAL LINK FT
You can access the FollowMyHealth Patient Portal offered by [facility] by registering at the following website: [email or web address]. By joining [employer]’s FollowMyHealth portal, you will also be able to view your health information using other applications (apps) compatible with our system.

## 2023-07-14 NOTE — ED PROVIDER NOTE - CLINICAL SUMMARY MEDICAL DECISION MAKING FREE TEXT BOX
Patient currently on her menses.  Will evaluate for SBO given prior surgery.  Possible gastritis versus esophagitis.  Less likely pancreatitis or biliary pathology given location of her pain.  Also less likely diverticulitis or appendicitis.  We will plan on lab work, CT scan with IV and p.o. contrast.  Plan for fluids and Zofran. No urinary complaints to suggest stone or pyelo.

## 2023-07-14 NOTE — ED ADULT NURSE NOTE - CHPI ED NUR SYMPTOMS POS
CRAMPS/DECREASED EATING/DRINKING/NAUSEA dry mucous membranes, decreased urinary output/CRAMPS/DECREASED EATING/DRINKING/NAUSEA

## 2023-07-14 NOTE — ED PROVIDER NOTE - NSFOLLOWUPINSTRUCTIONS_ED_ALL_ED_FT
Gastritis, Adult    Gastritis is inflammation of the stomach. There are two kinds of gastritis:  •Acute gastritis. This kind develops suddenly.  •Chronic gastritis. This kind is much more common. It develops slowly and lasts for a long time.    Gastritis happens when the lining of the stomach becomes weak or gets damaged. Without treatment, gastritis can lead to stomach bleeding and ulcers.    What are the causes?    This condition may be caused by:  •An infection.  •Drinking too much alcohol.  •Certain medicines. These include steroids, antibiotics, and some over-the-counter medicines, such as aspirin or ibuprofen.  •Having too much acid in the stomach.  •Having a disease of the stomach.    Other causes may include:  •An allergic reaction.  •Some cancer treatments (radiation).  •Smoking cigarettes or the use of products that contain nicotine or tobacco.    In some cases, the cause of this condition is not known.    What increases the risk?  •Having a disease of the intestines.  •Having a disease in which the body's immune system attacks the body (autoimmune disease), such as Crohn's disease.  •Using aspirin or ibuprofen and other NSAIDs to treat other conditions, such as heart disease or chronic pain.  •Stress.    What are the signs or symptoms?    Symptoms of this condition include:  •Pain or a burning sensation in the upper abdomen.  •Nausea.  •Vomiting.  •An uncomfortable feeling of fullness after eating.  •Weight loss.  •Bad breath.  •Blood in your vomit or stool (feces).    In some cases, there are no symptoms.    How is this diagnosed?    This condition may be diagnosed based on your medical history, a physical exam, and tests. Tests may include:  •Your medical history and a description of your symptoms.  •A physical exam.  •Tests. These can include:  •Blood tests.  •Stool tests.  •A test in which a thin, flexible instrument with a light and a camera is passed down the esophagus and into the stomach (upper endoscopy).  •A test in which a tissue sample is removed to look at it under a microscope (biopsy).    How is this treated?    This condition may be treated with medicines. The medicines that are used vary depending on the cause of the gastritis.  •If the condition is caused by a bacterial infection, you may be given antibiotic medicines.  •If the condition is caused by too much acid in the stomach, you may be given medicines called H2 blockers, proton pump inhibitors, or antacids.    Treatment may also involve stopping the use of certain medicines such as aspirin or ibuprofen and other NSAIDs.    Follow these instructions at home:    Medicines   •Take over-the-counter and prescription medicines only as told by your health care provider.  •If you were prescribed an antibiotic medicine, take it as told by your health care provider. Do not stop taking the antibiotic even if you start to feel better.    Alcohol use   • Do not drink alcohol if:  •Your health care provider tells you not to drink.  •You are pregnant, may be pregnant, or are planning to become pregnant.  •If you drink alcohol:•Limit your use to:  •0–1 drink a day for women.  •0–2 drinks a day for men.    •Know how much alcohol is in your drink. In the [place] one drink equals one 12 oz bottle of beer (355 mL), one 5 oz glass of wine (148 mL), or one 1½ oz glass of hard liquor (44 mL).    General instructions   A comparison of three sample cups showing dark yellow, yellow, and pale yellow urine.   •Eat small, frequent meals instead of large meals.  •Avoid foods and drinks that make your symptoms worse.  •Talk with your health care provider about ways to manage stress, such as getting regular exercise or practicing deep breathing, meditation, or yoga.  • Do not use any products that contain nicotine or tobacco. These products include cigarettes, chewing tobacco, and vaping devices, such as e-cigarettes. If you need help quitting, ask your health care provider.  •Drink enough fluid to keep your urine pale yellow.  •Keep all follow-up visits. This is important.    Contact a health care provider if:  •Your symptoms get worse.  •Your abdominal pain gets worse.  •Your symptoms return after treatment.  •You have a fever.    Get help right away if:  •You vomit blood or a substance that looks like coffee grounds.  •You have black or dark red stools.  •You are unable to keep fluids down.    These symptoms may represent a serious problem that is an emergency. Do not wait to see if the symptoms will go away. Get medical help right away. Call your local emergency services (911 in the [place] Do not drive yourself to the hospital.     Summary  •Gastritis is inflammation of the lining of the stomach that can occur suddenly (acute) or develop slowly over time (chronic).  •This condition is diagnosed with a medical history, a physical exam, or tests.  •This condition may be treated with medicines to treat infection or medicines to reduce the amount of acid in your stomach.  •Follow your health care provider's instructions about taking medicines, making changes to your diet, and knowing when to call for help.    This information is not intended to replace advice given to you by your health care provider. Make sure you discuss any questions you have with your health care provider.

## 2023-07-14 NOTE — ED PROVIDER NOTE - OBJECTIVE STATEMENT
Patient with past medical history of sleeve gastrectomy by Dr. Celis is presenting for nausea and vomiting.  Present for the past 10 days.  Has been constant.  Nothing is made her feel better or worse.  Having regular bowel movements during this time.  Endorsing minimal left upper quadrant pain.  No fevers or cough.  No urinary complaints.  No chest pain or shortness of breath.

## 2023-07-14 NOTE — CONSULT NOTE ADULT - SUBJECTIVE AND OBJECTIVE BOX
Bariatric Surgery Consult    HPI:  17F with h/o laparoscopic sleeve gastrectomy in Dec 2022 presenting with 1 week of nausea and dehydration. Pt states she has nausea after eating or drinking anything, which lasts for about 1-2 hrs and resolves. She has not taken anything for nausea. She denies associated abdominal pain. However for the past 2 days she notes some left sided 'cramping' that is not associated in onset with her nausea or with PO intake. She also notes that she started her menstrual period today. She is passing flatus and having BMs, not requiring stool softeners.  Denies NSAID or marijuana use.       PAST MEDICAL & SURGICAL HISTORY:  PAST MEDICAL & SURGICAL HISTORY:  Obesity, morbid, BMI 50 or higher      SANDOVAL on CPAP      History of sleeve gastrectomy          FAMILY HISTORY:   FAMILY HISTORY:      MEDICATIONS:      ALLERGIES:  Seafood (Unknown)  No Known Drug Allergies      VITALS & I/Os:  Vital Signs Last 24 Hrs  T(C): 36.8 (14 Jul 2023 15:00), Max: 36.8 (14 Jul 2023 15:00)  T(F): 98.3 (14 Jul 2023 15:00), Max: 98.3 (14 Jul 2023 15:00)  HR: 76 (14 Jul 2023 15:00) (76 - 76)  BP: 120/79 (14 Jul 2023 15:00) (120/79 - 120/79)  BP(mean): --  RR: 16 (14 Jul 2023 15:00) (16 - 16)  SpO2: 99% (14 Jul 2023 15:00) (99% - 99%)    Parameters below as of 14 Jul 2023 15:00  Patient On (Oxygen Delivery Method): room air        I&O's Summary      PHYSICAL EXAM:  General: No acute distress  Neuro: Awake and alert  Psych: calm, affect appropriate  HEENT: Anicteric, normocephalic  Respiratory: Nonlabored, equal chest rise  Cardiovascular: RRR  Abdominal: Soft, nondistended, nontender. No rebound or guarding. No organomegaly, no palpable mass.  Extremities: Warm, no calf tenderness    LABS:  PENDING      IMAGING:  PENDING

## 2023-07-14 NOTE — ED ADULT NURSE NOTE - OBJECTIVE STATEMENT
pt with c/o mid to upper left sided cramping abd pain, associated with nausea and inability to eat/drink anything without severe nausea since Tuesday of last week. hx gastric sleeve in December, recovery has been uneventful thus far. pt reports she is starting to feel really dehydrated, dizzy with position changes, generally weak due to inability to tolerate food without nausea and cramping pain. denies pregnancy, LMP today.

## 2023-07-14 NOTE — ED PROVIDER NOTE - PHYSICAL EXAMINATION
Constitutional: Awake, Alert, non-toxic. No acute distress.  HEAD: Normocephalic, atraumatic.   EYES: PERRL, EOM intact, conjunctiva and sclera are clear bilaterally.  ENT: External ears normal. No rhinorrhea, no tracheal deviation   NECK: Supple, non-tender  CARDIOVASCULAR: regular rate and rhythm.  RESPIRATORY: Normal respiratory effort; breath sounds CTAB, no wheezes, rhonchi, or rales. Speaking in full sentences. No accessory muscle use.   ABDOMEN: Soft; LUQ TTP, non-distended. No rebound or guarding.   MSK:  no lower extremity edema, no deformities  SKIN: Warm, dry  NEURO: A&O x3. Sensory and motor functions are grossly intact. Speech is normal. No facial droop.  PSYCH: Appearance and judgement seem appropriate for gender and age.

## 2023-07-14 NOTE — ED PROVIDER NOTE - PROGRESS NOTE DETAILS
Patient seen by surgery team, given normal-appearing scan and well-appearing labs, patient cleared for discharge.  Spoke with Dr. domingo who agrees.  Symptoms likely related to gastritis.  I did discuss findings of lung nodule as well as right lower quadrant lymph nodes with patient which she plans to follow-up with her primary care doctor for.  We will send medications to pharmacy.  Plan to discharge patient. Return to ED precautions were discussed with the patient/family. All questions were answered. Ap Kee MD.

## 2023-08-30 ENCOUNTER — NON-APPOINTMENT (OUTPATIENT)
Age: 18
End: 2023-08-30

## 2023-09-03 ENCOUNTER — NON-APPOINTMENT (OUTPATIENT)
Age: 18
End: 2023-09-03

## 2023-09-11 ENCOUNTER — NON-APPOINTMENT (OUTPATIENT)
Age: 18
End: 2023-09-11

## 2023-09-19 ENCOUNTER — NON-APPOINTMENT (OUTPATIENT)
Age: 18
End: 2023-09-19

## 2023-09-24 ENCOUNTER — EMERGENCY (EMERGENCY)
Facility: HOSPITAL | Age: 18
LOS: 0 days | Discharge: HOME | End: 2023-09-24
Attending: STUDENT IN AN ORGANIZED HEALTH CARE EDUCATION/TRAINING PROGRAM
Payer: MEDICAID

## 2023-09-24 VITALS
OXYGEN SATURATION: 99 % | SYSTOLIC BLOOD PRESSURE: 124 MMHG | RESPIRATION RATE: 20 BRPM | HEART RATE: 76 BPM | DIASTOLIC BLOOD PRESSURE: 72 MMHG | TEMPERATURE: 98 F | WEIGHT: 213.41 LBS

## 2023-09-24 DIAGNOSIS — Z99.89 DEPENDENCE ON OTHER ENABLING MACHINES AND DEVICES: ICD-10-CM

## 2023-09-24 DIAGNOSIS — J02.9 ACUTE PHARYNGITIS, UNSPECIFIED: ICD-10-CM

## 2023-09-24 DIAGNOSIS — Z91.013 ALLERGY TO SEAFOOD: ICD-10-CM

## 2023-09-24 DIAGNOSIS — Z90.3 ACQUIRED ABSENCE OF STOMACH [PART OF]: ICD-10-CM

## 2023-09-24 DIAGNOSIS — Z90.3 ACQUIRED ABSENCE OF STOMACH [PART OF]: Chronic | ICD-10-CM

## 2023-09-24 DIAGNOSIS — J02.0 STREPTOCOCCAL PHARYNGITIS: ICD-10-CM

## 2023-09-24 DIAGNOSIS — G47.33 OBSTRUCTIVE SLEEP APNEA (ADULT) (PEDIATRIC): ICD-10-CM

## 2023-09-24 PROCEDURE — 99283 EMERGENCY DEPT VISIT LOW MDM: CPT

## 2023-09-24 PROCEDURE — 99283 EMERGENCY DEPT VISIT LOW MDM: CPT | Mod: 25

## 2023-09-24 RX ORDER — AZITHROMYCIN 500 MG/1
1 TABLET, FILM COATED ORAL
Qty: 1 | Refills: 0
Start: 2023-09-24 | End: 2023-09-28

## 2023-09-24 RX ORDER — IBUPROFEN 200 MG
600 TABLET ORAL ONCE
Refills: 0 | Status: COMPLETED | OUTPATIENT
Start: 2023-09-24 | End: 2023-09-24

## 2023-09-24 RX ORDER — DEXAMETHASONE 0.5 MG/5ML
10 ELIXIR ORAL ONCE
Refills: 0 | Status: COMPLETED | OUTPATIENT
Start: 2023-09-24 | End: 2023-09-24

## 2023-09-24 RX ADMIN — Medication 600 MILLIGRAM(S): at 06:25

## 2023-09-24 RX ADMIN — Medication 10 MILLIGRAM(S): at 06:25

## 2023-09-24 NOTE — ED PROVIDER NOTE - PATIENT PORTAL LINK FT
You can access the FollowMyHealth Patient Portal offered by [facility] by registering at the following website: [email or web address]. By joining [employer]’s FollowMyHealth portal, you will also be able to view your health information using other applications (apps) compatible with our system.

## 2023-09-24 NOTE — ED PROVIDER NOTE - NSFOLLOWUPINSTRUCTIONS_ED_ALL_ED_FT
Our Emergency Department Referral Coordinators will be reaching out ot you in the next 24-48 hours from 9:00am to 5:00pm (Monday to Friday) with a follow up appointment. Please expect a phone call from the hospital in that time frame. If you do not receive a call or if you have any questions or concerns, you can reach them at [phone] or [phone].      Strep Throat    Strep throat is an infection of the throat. It is caused by germs. Strep throat spreads from person to person because of coughing, sneezing, or close contact.    Follow these instructions at home:  Medicines     Take over-the-counter and prescription medicines only as told by your doctor.  Take your antibiotic medicine as told by your doctor. Do not stop taking the medicine even if you feel better.  Have family members who also have a sore throat or fever go to a doctor.  Eating and drinking     Do not share food, drinking cups, or personal items.  Try eating soft foods until your sore throat feels better.  Drink enough fluid to keep your pee (urine) clear or pale yellow.  General instructions     Rinse your mouth (gargle) with a salt-water mixture 3–4 times per day or as needed. To make a salt-water mixture, stir ½–1 tsp of salt into 1 cup of warm water.  Make sure that all people in your house wash their hands well.  Rest.  Stay home from school or work until you have been taking antibiotics for 24 hours.  Keep all follow-up visits as told by your doctor. This is important.    Contact a doctor if:  Your neck keeps getting bigger.  You get a rash, cough, or earache.  You cough up thick liquid that is green, yellow-brown, or bloody.  You have pain that does not get better with medicine.  Your problems get worse instead of getting better.  You have a fever.  Get help right away if:  You throw up (vomit).  You get a very bad headache.  You neck hurts or it feels stiff.  You have chest pain or you are short of breath.  You have drooling, very bad throat pain, or changes in your voice.  Your neck is swollen or the skin gets red and tender.  Your mouth is dry or you are peeing less than normal.  You keep feeling more tired or it is hard to wake up.  Your joints are red or they hurt.    This information is not intended to replace advice given to you by your health care provider. Make sure you discuss any questions you have with your health care provider.

## 2023-09-24 NOTE — ED PROVIDER NOTE - CLINICAL SUMMARY MEDICAL DECISION MAKING FREE TEXT BOX
18 yo F presented to ED with sore throat.  Appropriate medications for patient's presenting complaints were ordered and effects were reassessed, given decadron and Motrin. Patient's records (prior hospital, ED visit, and/or nursing home notes if available) were reviewed.  Additional history was obtained from EMS, family, and/or PCP (where available). Escalation to admission/observation was considered.  However patient feels much better and is comfortable with discharge.  Appropriate follow-up was arranged. Return precautions discussed in detail.

## 2023-09-24 NOTE — ED PROVIDER NOTE - ATTENDING CONTRIBUTION TO CARE
16 y/o F PMHx SANDOVAL s/p sleeve gastrectomy, recurrent strep throat infections, presents to ED for evaluation of sore throat for 5 days, started on amoxicillin for strep but reports worsening pain and painful swallowing. No fevers, nausea, vomiting, CP, SOB. Pt reports she typically takes amoxicillin and azithromycin for strep throat.     CONSTITUTIONAL: NAD.   SKIN: warm, dry  HEAD: Normocephalic; atraumatic.  EYES: no conjunctival injection.   ENT: MMM, bilateral enlarged tonsils b/l 2+ erythematous, no exudates, uvula midline   NECK: Supple. (+) bilateral cervical LAD.   CARD: RRR.   RESP: No wheezes, rales or rhonchi.  ABD: soft ntnd.   NEURO: Alert, oriented, grossly unremarkable.  PSYCH: Cooperative, appropriate.

## 2023-09-24 NOTE — ED PROVIDER NOTE - PLAN OF CARE
Evaluated in the ED. Exam reassuring, consistent with persistent strep throat infection. Administered decadron, motrin with some relief. Sent azithromycin to pharmacy to take after completion of ongoing amoxicillin course. Stable for discharge. Recommend antibiotics + motrin for control of pain and inflammation, with ENT follow up. Follow up with PMD in 1-3 days.

## 2023-09-24 NOTE — ED PROVIDER NOTE - NSFOLLOWUPCLINICS_GEN_ALL_ED_FT
[facility]  ENT  [address] [place], [address] [place] [address]  Phone: [phone]  Fax:   Follow Up Time: 7-10 Days

## 2023-09-24 NOTE — ED PROVIDER NOTE - CARE PROVIDER_API CALL
Lazaro Vázquez [facility]  Pediatrics  [facility] [place] [address]  Phone: [phone]  Fax: [phone]  Established Patient  Follow Up Time: 1-3 Days

## 2023-09-24 NOTE — ED PROVIDER NOTE - PHYSICAL EXAMINATION
T(C): 36.6 (09-24-23 @ 05:49), Max: 36.6 (09-24-23 @ 05:49)  HR: 76 (09-24-23 @ 05:49) (76 - 76)  BP: 124/72 (09-24-23 @ 05:49) (124/72 - 124/72)  RR: 20 (09-24-23 @ 05:49) (20 - 20)  SpO2: 99% (09-24-23 @ 05:49) (99% - 99%)    CONSTITUTIONAL: Well groomed, alert, interactive, no apparent distress  ENMT: Oral mucosa with moist membranes. Normal dentition; no pharyngeal injection or exudates; Bilateral tonsils 2+, erythematous, pits present, no exudate, uvula midline, posterior pharyngeal landmarks well appreciated, palate normal  RESP: No respiratory distress, no use of accessory muscles; CTA b/l, no WRR  CV: RRR, +S1S2  GI: Soft, NT, ND  LYMPH: Bilateral anterior cervical LAD - tender, mobile  SKIN: No rashes   NEURO: Grossly intact   PSYCH: Appropriate insight/judgment; A+O x 3, mood and affect appropriate, recent/remote memory intact

## 2023-09-24 NOTE — ED PROVIDER NOTE - CARE PLAN
1 Principal Discharge DX:	Strep sore throat  Assessment and plan of treatment:	Evaluated in the ED. Exam reassuring, consistent with persistent strep throat infection. Administered decadron, motrin with some relief. Sent azithromycin to pharmacy to take after completion of ongoing amoxicillin course. Stable for discharge. Recommend antibiotics + motrin for control of pain and inflammation, with ENT follow up. Follow up with PMD in 1-3 days.

## 2023-09-24 NOTE — ED PROVIDER NOTE - OBJECTIVE STATEMENT
17y F, pmhx SANDOVAL, s/p sleeve gastrectomy, recurrent strep throat infections, presenting to the ED for worsening sore throat and painful swallow. Patient was diagnosed with strep throat infection on 9/20 at an urgent care, rapid test positive (culture not sent), was prescribed 10 day course of amoxicillin. Has had progressively increasing throat pain, sensation of enlarged tonsils and pain while speaking and swallowing both solids and liquids. Endorses slight change in voice and ear pain since yesterday. Took tylenol for pain yesterday morning, has not tried motrin. Denies difficulty breathing, noisy/fast breathing, rashes, abdominal pain. 17y F, pmhx SANDOVAL, s/p sleeve gastrectomy, recurrent strep throat infections, presenting to the ED for worsening sore throat and painful swallow. Patient was diagnosed with strep throat infection on 9/20 at an urgent care, rapid test positive (culture not sent), was prescribed 10 day course of amoxicillin. Has had progressively increasing throat pain, sensation of enlarged tonsils and pain while speaking and swallowing both solids and liquids. Endorses slight change in voice and ear pain since yesterday. Took tylenol for pain yesterday morning, has not tried motrin. Denies fevers, chills, difficulty breathing, noisy/fast breathing, rashes, abdominal pain.    Endorses recurrent strep throat infections, about every 3-4 months. Last episode 2 months ago responded to course of amoxicillin followed by azithromycin. Has seen ENT in the past, per patient - tonsillectomy not done as sleep study for SANDOVAL did not appear to be impacted by enlarged tonsils.

## 2023-09-24 NOTE — ED PEDIATRIC TRIAGE NOTE - CHIEF COMPLAINT QUOTE
Pt came c/o sore throat, pt was diagnosed with strep throat 3 days ago, on Amoxicillin x 3 days but pain is getting worse and left tonsil is more swollen.

## 2023-10-06 ENCOUNTER — APPOINTMENT (OUTPATIENT)
Dept: OTOLARYNGOLOGY | Facility: CLINIC | Age: 18
End: 2023-10-06

## 2024-01-10 ENCOUNTER — NON-APPOINTMENT (OUTPATIENT)
Age: 19
End: 2024-01-10

## 2024-01-25 ENCOUNTER — NON-APPOINTMENT (OUTPATIENT)
Age: 19
End: 2024-01-25

## 2024-05-04 ENCOUNTER — NON-APPOINTMENT (OUTPATIENT)
Age: 19
End: 2024-05-04

## 2024-05-11 NOTE — PROGRESS NOTE ADULT - SUBJECTIVE AND OBJECTIVE BOX
HPI:   16yo F s/p sleeve (). Pt seen and examined at the bedside.  States she feels much better today. Fatigue/dizziness resolved. Nausea intermittent, but improved significantly. Had small BM yesterday after enema. Patient is ambulating on the floor and utilizing incentive spirometry. She is tolerating clear liquid diet and urinating well. States her urine is still dark but improved from yesterday. Denies reflux.     VITALS:  Vital Signs Last 24 Hrs  T(C): 36.9 (2023 07:55), Max: 36.9 (2023 07:55)  T(F): 98.5 (2023 07:55), Max: 98.5 (2023 07:55)  HR: 45 (2023 07:55) (45 - 72)  BP: 112/76 (2023 07:55) (111/74 - 126/89)  BP(mean): 90 (2023 20:15) (90 - 101)  RR: 20 (2023 07:55) (15 - 20)  SpO2: 99% (2023 07:55) (97% - 99%)    Parameters below as of 2023 07:55  Patient On (Oxygen Delivery Method): room air    LABS:                        13.9   6.45  )-----------( 293      ( 2023 14:43 )             43.5         138  |  97  |  7   ----------------------------<  113<H>  3.5   |  24  |  0.69    Ca    9.5      2023 14:43    Urinalysis Basic - ( 2023 14:26 )  Color: Yellow / Appearance: Slightly Turbid / S.020 / pH: x  Gluc: x / Ketone: Large  / Bili: Moderate / Urobili: 12 mg/dL   Blood: x / Protein: 100 mg/dL / Nitrite: Negative   Leuk Esterase: Trace / RBC: 0-2 /HPF / WBC 3-5   Sq Epi: x / Non Sq Epi: Occasional / Bacteria: Few    Physical Exam:  General: A/O x 3, NAD, resting comfortably in bed  Abdominal: soft, ND, BS X 4.   incisions healed well  Extremities: no edema, no calf tenderness B/L    A/P:   17y year old Female s/p lap sleeve on 22. Present to ER due to n/v and constipation.   Symptoms improving.   Small bm yesterday.     Cont GI/DVT prophylaxis.   Pt to shower   Cont  OOB/ambulation on floor / incentive spirometry.  Diet- fulls  Miralax BID  Possibly discharged later today.  Case and plan D/W Dr. Celis                   HPI:   18yo F s/p sleeve (). Pt seen and examined at the bedside.  States she feels much better today. Fatigue/dizziness resolved. Nausea intermittent, but improved significantly. Had small BM yesterday after enema. Patient is ambulating on the floor and utilizing incentive spirometry. She is tolerating clear liquid diet and urinating well. States her urine is still dark but improved from yesterday. Denies reflux.     VITALS:  Vital Signs Last 24 Hrs  T(C): 36.9 (2023 07:55), Max: 36.9 (2023 07:55)  T(F): 98.5 (2023 07:55), Max: 98.5 (2023 07:55)  HR: 45 (2023 07:55) (45 - 72)  BP: 112/76 (2023 07:55) (111/74 - 126/89)  BP(mean): 90 (2023 20:15) (90 - 101)  RR: 20 (2023 07:55) (15 - 20)  SpO2: 99% (2023 07:55) (97% - 99%)    Parameters below as of 2023 07:55  Patient On (Oxygen Delivery Method): room air    LABS:                        13.9   6.45  )-----------( 293      ( 2023 14:43 )             43.5         138  |  97  |  7   ----------------------------<  113<H>  3.5   |  24  |  0.69    Ca    9.5      2023 14:43    Urinalysis Basic - ( 2023 14:26 )  Color: Yellow / Appearance: Slightly Turbid / S.020 / pH: x  Gluc: x / Ketone: Large  / Bili: Moderate / Urobili: 12 mg/dL   Blood: x / Protein: 100 mg/dL / Nitrite: Negative   Leuk Esterase: Trace / RBC: 0-2 /HPF / WBC 3-5   Sq Epi: x / Non Sq Epi: Occasional / Bacteria: Few    Physical Exam:  General: A/O x 3, NAD, resting comfortably in bed  Abdominal: soft, ND, BS X 4.   incisions healed well  Extremities: no edema, no calf tenderness B/L    A/P:   17y year old Female s/p lap sleeve on 22. Present to ER due to n/v and constipation.   Symptoms improving.   Small bm yesterday.     Cont GI/DVT prophylaxis.   Pt to shower   Cont  OOB/ambulation on floor / incentive spirometry.  Diet- fulls  Miralax BID    Planning d/c  Case and plan D/W Dr. Celis                   PAST MEDICAL HISTORY:  No pertinent past medical history

## 2024-06-14 NOTE — ED PROVIDER NOTE - CROS ED RESP ALL NEG
Sent pt a My Chart message informing her that EJREMI Bright's nurse has her already scheduled on 7/3/24 @ 2:00 PM.   
- - -

## 2024-07-01 ENCOUNTER — NON-APPOINTMENT (OUTPATIENT)
Age: 19
End: 2024-07-01

## 2024-08-12 ENCOUNTER — EMERGENCY (EMERGENCY)
Facility: HOSPITAL | Age: 19
LOS: 0 days | Discharge: HOME | End: 2024-08-13
Attending: EMERGENCY MEDICINE
Payer: MEDICAID

## 2024-08-12 VITALS
HEIGHT: 69 IN | DIASTOLIC BLOOD PRESSURE: 88 MMHG | RESPIRATION RATE: 16 BRPM | OXYGEN SATURATION: 98 % | SYSTOLIC BLOOD PRESSURE: 133 MMHG | HEART RATE: 67 BPM | WEIGHT: 229.94 LBS | TEMPERATURE: 98 F

## 2024-08-12 DIAGNOSIS — Z90.3 ACQUIRED ABSENCE OF STOMACH [PART OF]: Chronic | ICD-10-CM

## 2024-08-12 DIAGNOSIS — H10.023 OTHER MUCOPURULENT CONJUNCTIVITIS, BILATERAL: ICD-10-CM

## 2024-08-12 DIAGNOSIS — Z91.013 ALLERGY TO SEAFOOD: ICD-10-CM

## 2024-08-12 DIAGNOSIS — N93.9 ABNORMAL UTERINE AND VAGINAL BLEEDING, UNSPECIFIED: ICD-10-CM

## 2024-08-12 LAB
APPEARANCE UR: CLEAR — SIGNIFICANT CHANGE UP
BACTERIA # UR AUTO: NEGATIVE /HPF — SIGNIFICANT CHANGE UP
BILIRUB UR-MCNC: NEGATIVE — SIGNIFICANT CHANGE UP
CAST: 0 /LPF — SIGNIFICANT CHANGE UP (ref 0–4)
COLOR SPEC: YELLOW — SIGNIFICANT CHANGE UP
DIFF PNL FLD: ABNORMAL
GLUCOSE UR QL: NEGATIVE MG/DL — SIGNIFICANT CHANGE UP
KETONES UR-MCNC: ABNORMAL MG/DL
LEUKOCYTE ESTERASE UR-ACNC: NEGATIVE — SIGNIFICANT CHANGE UP
NITRITE UR-MCNC: NEGATIVE — SIGNIFICANT CHANGE UP
PH UR: 6.5 — SIGNIFICANT CHANGE UP (ref 5–8)
PROT UR-MCNC: NEGATIVE MG/DL — SIGNIFICANT CHANGE UP
RBC CASTS # UR COMP ASSIST: 23 /HPF — HIGH (ref 0–4)
SP GR SPEC: 1.03 — SIGNIFICANT CHANGE UP (ref 1–1.03)
SQUAMOUS # UR AUTO: 3 /HPF — SIGNIFICANT CHANGE UP (ref 0–5)
UROBILINOGEN FLD QL: 1 MG/DL — SIGNIFICANT CHANGE UP (ref 0.2–1)
WBC UR QL: 1 /HPF — SIGNIFICANT CHANGE UP (ref 0–5)

## 2024-08-12 PROCEDURE — 81001 URINALYSIS AUTO W/SCOPE: CPT

## 2024-08-12 PROCEDURE — 99284 EMERGENCY DEPT VISIT MOD MDM: CPT

## 2024-08-12 PROCEDURE — 99283 EMERGENCY DEPT VISIT LOW MDM: CPT

## 2024-08-13 NOTE — ED PROVIDER NOTE - PHYSICAL EXAMINATION
General: Awake, alert, NAD.  HEENT: conjunctiva and sclera clear, no nasal congestion, moist mucous membranes, oropharynx without erythema or exudates, supple neck, no cervical lymphadenopathy.  RESP: CTAB, no wheezes, no increased work of breathing  CVS: RRR, S1 S2, no extra heart sounds, no murmurs, cap refill <2 sec, 2+ peripheral pulses.  ABD: (+) BS, soft, NTND.  : No costovertebral angle tenderness

## 2024-08-13 NOTE — ED PROVIDER NOTE - ATTENDING CONTRIBUTION TO CARE
18-year-old female to ED with vaginal bleeding.  Patient having vaginal bleeding for 5 days bravo.  Using several pads a day.  Was concerned as persistent symptoms.  And had a hemoglobin done last week which was 13.  Also has recent pelvic ultrasound and recheck ovarian cyst.  Patient concerned today for worsening cysts or fibroids as history of present family to the ED for eval.Afebrile vital signs stable exam as noted clear lungs bilaterally conjunctiva pink HEENT normal, regular rate and rhythm abdomen soft nontender and neuro nonfocal.

## 2024-08-13 NOTE — ED PROVIDER NOTE - PATIENT PORTAL LINK FT
You can access the FollowMyHealth Patient Portal offered by [facility] by registering at the following website: [email or web address]. By joining [employer]’s FollowMyHealth portal, you will also be able to view your health information using other applications (apps) compatible with our system.

## 2024-08-13 NOTE — ED PROVIDER NOTE - CLINICAL SUMMARY MEDICAL DECISION MAKING FREE TEXT BOX
Patient had decided she does not want to wait for her ultrasound testing wa wants to be s discharged home

## 2024-08-13 NOTE — ED PROVIDER NOTE - NSFOLLOWUPINSTRUCTIONS_ED_ALL_ED_FT
Follow up with OBGYN clinic in 1-2 days.     Get help right away if:  You soak through more than a pad or tampon in 1 hour.  You pass clots bigger than 1 inch (2.5 cm) wide.  You feel short of breath.  You feel like your heart is beating too fast.  You feel dizzy or you faint.  You feel very weak or tired.  Summary  Menorrhagia is a form of abnormal uterine bleeding in which menstrual periods are heavy or last longer than normal.  Treatment may not be needed for this condition. If it is needed, it may include medicines or procedures.  Take over-the-counter and prescription medicines only as told by your health care provider. This includes iron pills.  Get help right away if you have heavy bleeding that soaks through more than a pad or tampon in 1 hour, you pass large clots, or you feel dizzy, short of breath, or very weak or tired.

## 2024-08-13 NOTE — ED PROVIDER NOTE - OBJECTIVE STATEMENT
18 year old female presenting 18 year old female presenting with vaginal bleeding. LMP was July 18th-21rst. Menstrual 18 year old female presenting with vaginal bleeding. LMP was July 18th-21rst. Menstrual cycles are shorter, and regular.  Not associated with severe cramping or radiating pain.  Since last wednesday, patient reports light spotting, which worsened.  Associated with cramping initially laying worsening throughout the week patient reports 5-6 pads per day.  Yesterday patient also reports feeling weak tired and lightheaded.  Past medical history PCOS twisted ovaries.  Last sexually active 3 weeks ago.  Last STD testing approximately 2 weeks ago.  No known allergies.  Hemoglobin tested checked this week was 13.3.

## 2024-08-13 NOTE — ED PROVIDER NOTE - NSFOLLOWUPCLINICS_GEN_ALL_ED_FT
[facility]  OB/GYN  [address] [place] [address]  Phone: [phone]  Fax:

## 2024-09-17 NOTE — ASU PREOP CHECKLIST, PEDIATRIC - WEIGHT GM
Pt presents for lab specimen collection via port a cath. Port accessed, labs drawn, saline locked and de accessed without difficulty. Declined AVS, next appointment 9/18 2pm.      [id number]

## 2024-12-03 ENCOUNTER — NON-APPOINTMENT (OUTPATIENT)
Age: 19
End: 2024-12-03

## 2025-03-17 ENCOUNTER — EMERGENCY (EMERGENCY)
Facility: HOSPITAL | Age: 20
LOS: 1 days | Discharge: HOME | End: 2025-03-17
Attending: EMERGENCY MEDICINE
Payer: COMMERCIAL

## 2025-03-17 VITALS
DIASTOLIC BLOOD PRESSURE: 87 MMHG | TEMPERATURE: 98 F | OXYGEN SATURATION: 99 % | RESPIRATION RATE: 20 BRPM | HEART RATE: 92 BPM | WEIGHT: 253.75 LBS | SYSTOLIC BLOOD PRESSURE: 126 MMHG

## 2025-03-17 DIAGNOSIS — Z90.3 ACQUIRED ABSENCE OF STOMACH [PART OF]: Chronic | ICD-10-CM

## 2025-03-17 LAB
ALBUMIN SERPL ELPH-MCNC: 4.3 G/DL — SIGNIFICANT CHANGE UP (ref 3.3–5.2)
ALP SERPL-CCNC: 57 U/L — SIGNIFICANT CHANGE UP (ref 40–120)
ALT FLD-CCNC: 12 U/L — SIGNIFICANT CHANGE UP
ANION GAP SERPL CALC-SCNC: 13 MMOL/L — SIGNIFICANT CHANGE UP (ref 5–17)
AST SERPL-CCNC: 14 U/L — SIGNIFICANT CHANGE UP
BASOPHILS # BLD AUTO: 0.05 K/UL — SIGNIFICANT CHANGE UP (ref 0–0.2)
BASOPHILS NFR BLD AUTO: 0.5 % — SIGNIFICANT CHANGE UP (ref 0–2)
BILIRUB SERPL-MCNC: 0.4 MG/DL — SIGNIFICANT CHANGE UP (ref 0.4–2)
BUN SERPL-MCNC: 19.5 MG/DL — SIGNIFICANT CHANGE UP (ref 8–20)
CALCIUM SERPL-MCNC: 8.9 MG/DL — SIGNIFICANT CHANGE UP (ref 8.4–10.5)
CHLORIDE SERPL-SCNC: 102 MMOL/L — SIGNIFICANT CHANGE UP (ref 96–108)
CO2 SERPL-SCNC: 22 MMOL/L — SIGNIFICANT CHANGE UP (ref 22–29)
CREAT SERPL-MCNC: 0.63 MG/DL — SIGNIFICANT CHANGE UP (ref 0.5–1.3)
EGFR: 129 ML/MIN/1.73M2 — SIGNIFICANT CHANGE UP
EGFR: 129 ML/MIN/1.73M2 — SIGNIFICANT CHANGE UP
EOSINOPHIL # BLD AUTO: 0.08 K/UL — SIGNIFICANT CHANGE UP (ref 0–0.5)
EOSINOPHIL NFR BLD AUTO: 0.8 % — SIGNIFICANT CHANGE UP (ref 0–6)
GLUCOSE SERPL-MCNC: 83 MG/DL — SIGNIFICANT CHANGE UP (ref 70–99)
HCG SERPL-ACNC: <4 MIU/ML — SIGNIFICANT CHANGE UP
HCT VFR BLD CALC: 36.6 % — SIGNIFICANT CHANGE UP (ref 34.5–45)
HGB BLD-MCNC: 11.9 G/DL — SIGNIFICANT CHANGE UP (ref 11.5–15.5)
IMM GRANULOCYTES # BLD AUTO: 0.03 K/UL — SIGNIFICANT CHANGE UP (ref 0–0.07)
IMM GRANULOCYTES NFR BLD AUTO: 0.3 % — SIGNIFICANT CHANGE UP (ref 0–0.9)
LYMPHOCYTES # BLD AUTO: 2.79 K/UL — SIGNIFICANT CHANGE UP (ref 1–3.3)
LYMPHOCYTES NFR BLD AUTO: 28.6 % — SIGNIFICANT CHANGE UP (ref 13–44)
MCHC RBC-ENTMCNC: 28.4 PG — SIGNIFICANT CHANGE UP (ref 27–34)
MCHC RBC-ENTMCNC: 32.5 G/DL — SIGNIFICANT CHANGE UP (ref 32–36)
MCV RBC AUTO: 87.4 FL — SIGNIFICANT CHANGE UP (ref 80–100)
MONOCYTES # BLD AUTO: 0.69 K/UL — SIGNIFICANT CHANGE UP (ref 0–0.9)
MONOCYTES NFR BLD AUTO: 7.1 % — SIGNIFICANT CHANGE UP (ref 2–14)
NEUTROPHILS # BLD AUTO: 6.12 K/UL — SIGNIFICANT CHANGE UP (ref 1.8–7.4)
NEUTROPHILS NFR BLD AUTO: 62.7 % — SIGNIFICANT CHANGE UP (ref 43–77)
NRBC # BLD AUTO: 0 K/UL — SIGNIFICANT CHANGE UP (ref 0–0)
NRBC # FLD: 0 K/UL — SIGNIFICANT CHANGE UP (ref 0–0)
NRBC BLD AUTO-RTO: 0 /100 WBCS — SIGNIFICANT CHANGE UP (ref 0–0)
PLATELET # BLD AUTO: 337 K/UL — SIGNIFICANT CHANGE UP (ref 150–400)
PMV BLD: 10.7 FL — SIGNIFICANT CHANGE UP (ref 7–13)
POTASSIUM SERPL-MCNC: 4.3 MMOL/L — SIGNIFICANT CHANGE UP (ref 3.5–5.3)
POTASSIUM SERPL-SCNC: 4.3 MMOL/L — SIGNIFICANT CHANGE UP (ref 3.5–5.3)
PROT SERPL-MCNC: 7.5 G/DL — SIGNIFICANT CHANGE UP (ref 6.6–8.7)
RBC # BLD: 4.19 M/UL — SIGNIFICANT CHANGE UP (ref 3.8–5.2)
RBC # FLD: 12.2 % — SIGNIFICANT CHANGE UP (ref 10.3–14.5)
SODIUM SERPL-SCNC: 137 MMOL/L — SIGNIFICANT CHANGE UP (ref 135–145)
WBC # BLD: 9.76 K/UL — SIGNIFICANT CHANGE UP (ref 3.8–10.5)
WBC # FLD AUTO: 9.76 K/UL — SIGNIFICANT CHANGE UP (ref 3.8–10.5)

## 2025-03-17 PROCEDURE — 96374 THER/PROPH/DIAG INJ IV PUSH: CPT

## 2025-03-17 PROCEDURE — 85025 COMPLETE CBC W/AUTO DIFF WBC: CPT

## 2025-03-17 PROCEDURE — 84702 CHORIONIC GONADOTROPIN TEST: CPT

## 2025-03-17 PROCEDURE — 99284 EMERGENCY DEPT VISIT MOD MDM: CPT

## 2025-03-17 PROCEDURE — 80053 COMPREHEN METABOLIC PANEL: CPT

## 2025-03-17 PROCEDURE — 36415 COLL VENOUS BLD VENIPUNCTURE: CPT

## 2025-03-17 PROCEDURE — 99284 EMERGENCY DEPT VISIT MOD MDM: CPT | Mod: 25

## 2025-03-17 RX ORDER — ONDANSETRON HCL/PF 4 MG/2 ML
4 VIAL (ML) INJECTION ONCE
Refills: 0 | Status: COMPLETED | OUTPATIENT
Start: 2025-03-17 | End: 2025-03-17

## 2025-03-17 RX ADMIN — Medication 4 MILLIGRAM(S): at 21:36

## 2025-03-17 RX ADMIN — Medication 1000 MILLILITER(S): at 21:36

## 2025-03-17 NOTE — ED PROVIDER NOTE - CLINICAL SUMMARY MEDICAL DECISION MAKING FREE TEXT BOX
19-year-old female no past medical history presents with nausea vomiting diarrhea for the past 3 days.  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  Got worried and came to the ED to get her stool tested.  Notes that vomiting has resolved just feels very nauseous today but still having diarrhea no blood in her vomit or diarrhea. Denies f/c//cp/sob/palpitations/ cough/rash/headache/dizziness//c/dysuria/hematuria. no recent travel    MOST LIKELY viral illness abd benign will check labs hydrate zofran offered to check stool ova/parasites gi pcr  po challenge reassess 19-year-old female no past medical history presents with nausea vomiting diarrhea for the past 3 days.  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  Got worried and came to the ED to get her stool tested.  Notes that vomiting has resolved just feels very nauseous today but still having diarrhea no blood in her vomit or diarrhea. Denies f/c//cp/sob/palpitations/ cough/rash/headache/dizziness//c/dysuria/hematuria. no recent travel. no recent abx use    MOST LIKELY viral illness abd benign will check labs hydrate zofran offered to check stool ova/parasites gi pcr  po challenge reassess    feeling much better unable to give stool sample would like to go home tolerating po will dc

## 2025-03-17 NOTE — ED ADULT TRIAGE NOTE - CHIEF COMPLAINT QUOTE
From home c/o abdominal pain with N/V/D since saturday denies any fevr & Urinary symptoms, claimed that she has been drinking infested water from [employer]

## 2025-03-17 NOTE — ED ADULT NURSE NOTE - PATIENT'S PREFERRED PRONOUN
As per Ms Valencia López will order Iron supplement , hematology consult and iron infusion prior to surgery. Dr López's office will contact patient.
Her/She

## 2025-03-17 NOTE — ED ADULT NURSE NOTE - NSFALLLASTSIX_ED_ALL_ED
Her chest soreness is best explained by the amount of CPR required at time of cardiac arrest.  There is reproduction on touching and it is persistent consistent with musculoskeletal pain.   No.

## 2025-03-17 NOTE — ED PROVIDER NOTE - PATIENT PORTAL LINK FT
You can access the FollowMyHealth Patient Portal offered by [facility] by registering at the following website: [email or web address]. By joining [employer]’s FollowMyHealth portal, you will also be able to view your health information using other applications (apps) compatible with our system.

## 2025-03-17 NOTE — ED PROVIDER NOTE - NSFOLLOWUPINSTRUCTIONS_ED_ALL_ED_FT
return to the ED if symptoms worsen, fever/chills, nausea/vomiting, chest pain, abdominal pain or worsening diarrhea follow up with PMD within 1 week

## 2025-03-17 NOTE — ED ADULT NURSE NOTE - OBJECTIVE STATEMENT
Patient is a 20yo female who presents to the ER with complaints of abdominal pain / NV. 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 Pt states she noticed today the gallon water jugs she had been drinking out of had larvae and larvae eggs. Upon assessment, PT is alert and oriented, with a patent and self maintained airway, with non labored breathing. PT denies CP, SOB, HA, Fevers or chills.

## 2025-03-17 NOTE — ED PROVIDER NOTE - OBJECTIVE STATEMENT
19-year-old female no past medical history presents with nausea vomiting diarrhea for the past 3 days.  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  Got worried and came to the ED to get her stool tested.  Notes that vomiting has resolved just feels very nauseous today but still having diarrhea no blood in her vomit or diarrhea. Denies f/c//cp/sob/palpitations/ cough/rash/headache/dizziness//c/dysuria/hematuria. no recent travel

## 2025-04-11 ENCOUNTER — NON-APPOINTMENT (OUTPATIENT)
Age: 20
End: 2025-04-11

## 2025-05-04 ENCOUNTER — NON-APPOINTMENT (OUTPATIENT)
Age: 20
End: 2025-05-04

## 2025-07-08 NOTE — ED ADULT NURSE NOTE - CAS DISCH TRANSFER METHOD
--DO NOT REPLY - Sent from PACT - If sent to wrong pool, reroute to P [facility] Reroute pool --    Message Type:  Refill Medication   Is the medication pended:Yes  Medication name:     phenyTOIN (Dilantin) 30 MG ER capsule     Message: n/a  Preferred pharmacy verified, and selected.  [facility] [address] [place] [address]  Call Back #: [phone]  Can a detailed message be left?  Yes - Voicemail   Is the patient OUT of Medication?  No : route as Routine priority according to KB. Patient has been advised the message will be reviewed within 2-3 business days.   Private car

## (undated) DEVICE — Device

## (undated) DEVICE — WARMING BLANKET LOWER ADULT

## (undated) DEVICE — ELCTR NESSY OMEGA RETURN 85CM W/4M CBL

## (undated) DEVICE — CATH ELECHMSTAT  INJ 7FR 210CM

## (undated) DEVICE — SUT SOFSILK 0 30" V-20

## (undated) DEVICE — TRAP SUCTION POLYP ENDODYNAMIC

## (undated) DEVICE — TROCAR COVIDIEN ENDO CLOSE SUTURING DEVICE

## (undated) DEVICE — SYR ALLIANCE II INFLATION 60ML

## (undated) DEVICE — SUT POLYSORB 0 30" GU-46

## (undated) DEVICE — FOLEY TRAY 14FR 5CC LTX BAG CLOSED

## (undated) DEVICE — DRAPE 3/4 SHEET 52X76"

## (undated) DEVICE — TROCAR COVIDIEN VISIPORT PLUS 5MM-12MM WITH FIXATION CANNULA

## (undated) DEVICE — SYR CATH TIP 2 OZ

## (undated) DEVICE — NDL HYPO REGULAR BEVEL 22G X 1.5" (TURQUOISE)

## (undated) DEVICE — VENODYNE/SCD SLEEVE CALF MEDIUM

## (undated) DEVICE — PROBE FIAPC DIA 2.3MM/7FR LNTH 220CM/7.2FT

## (undated) DEVICE — TROCAR ETHICON ENDOPATH XCEL BLADELESS 5MM X 100MM STABILITY

## (undated) DEVICE — LIGASURE MARYLAND 37CM

## (undated) DEVICE — DRAPE TOWEL BLUE 17" X 24"

## (undated) DEVICE — GLV 8 PROTEXIS (WHITE)

## (undated) DEVICE — WARMING BLANKET UPPER ADULT

## (undated) DEVICE — ENDOCATCH II 15MM

## (undated) DEVICE — TUBING SUCTION CONN 6FT STERILE

## (undated) DEVICE — SOL IRR POUR H2O 1000ML

## (undated) DEVICE — BITE BLOCK ADULT 20 X 27MM (GREEN)

## (undated) DEVICE — SOL IRR POUR NS 0.9% 1000ML

## (undated) DEVICE — D HELP - CLEARVIEW CLEARIFY SYSTEM

## (undated) DEVICE — SYR LUER LOK 10CC

## (undated) DEVICE — SHEARS HARMONIC ACE 5MM X 36CM CURVED TIP

## (undated) DEVICE — APPLICATOR Q TIP 6" WOOD STEM

## (undated) DEVICE — TUBING PLUME AWAY 4.0

## (undated) DEVICE — ELCTR BOVIE TIP BLADE INSULATED 2.75" EDGE

## (undated) DEVICE — TROCAR ETHICON ENDOPATH XCEL BLADELESS 15MM X 100MM STABILITY

## (undated) DEVICE — FORCEP RADIAL JAW 4 W NDL 2.4MM 2.8MM 240CM ORANGE DISP

## (undated) DEVICE — STAPLER COVIDIEN ENDO GIA XL HANDLE

## (undated) DEVICE — TUBING SUCTION 20FT

## (undated) DEVICE — SHEARS COVIDIEN ENDO SHEAR 5MM X 31CM W UNIPOLAR CAUTERY

## (undated) DEVICE — PROBE FIAPC CIRC O.D. 2.3MM/6.9FR LNTH 220CM/7.2FT

## (undated) DEVICE — TUBING STRYKEFLOW II SUCTION / IRRIGATOR

## (undated) DEVICE — RETRIEVER ROTH NET PLATINUM-UNIVERSAL

## (undated) DEVICE — SHEARS COVIDIEN ENDO SHEAR 5MM X 45CM LONG W MONOPOLAR CAUTERY

## (undated) DEVICE — NDL INJ SCLERO INTERJECT 25G

## (undated) DEVICE — PACK GENERAL LAPAROSCOPY

## (undated) DEVICE — GOWN ISOLATION WHITE

## (undated) DEVICE — SUCTION YANKAUER OPEN TIP NO VENT CURVE

## (undated) DEVICE — SUT MAXON 4-0 30" P-12

## (undated) DEVICE — SOL IRR BAG NS 0.9% 3000ML

## (undated) DEVICE — TUBING STRYKER PNEUMOSURE HI FLOW RTP

## (undated) DEVICE — BLADE SCALPEL SAFETYLOCK #15

## (undated) DEVICE — GLV 6.5 PROTEXIS (WHITE)

## (undated) DEVICE — SUT POLYSORB 3-0 30" V-20

## (undated) DEVICE — DRSG CURITY GAUZE SPONGE 4 X 4" 12-PLY

## (undated) DEVICE — TUBING CAP SET ENDO 24HR USE GI